# Patient Record
Sex: FEMALE | Race: WHITE | NOT HISPANIC OR LATINO | URBAN - METROPOLITAN AREA
[De-identification: names, ages, dates, MRNs, and addresses within clinical notes are randomized per-mention and may not be internally consistent; named-entity substitution may affect disease eponyms.]

---

## 2017-06-19 ENCOUNTER — OFFICE VISIT (OUTPATIENT)
Dept: FAMILY MEDICINE | Facility: CLINIC | Age: 42
End: 2017-06-19

## 2017-06-19 VITALS
HEART RATE: 66 BPM | BODY MASS INDEX: 31.4 KG/M2 | DIASTOLIC BLOOD PRESSURE: 79 MMHG | OXYGEN SATURATION: 97 % | SYSTOLIC BLOOD PRESSURE: 118 MMHG | TEMPERATURE: 98.8 F | WEIGHT: 196 LBS

## 2017-06-19 DIAGNOSIS — F17.200 TOBACCO USE DISORDER: ICD-10-CM

## 2017-06-19 DIAGNOSIS — N64.4 BREAST TENDERNESS: Primary | ICD-10-CM

## 2017-06-19 DIAGNOSIS — Z30.9 ENCOUNTER FOR CONTRACEPTIVE MANAGEMENT, UNSPECIFIED CONTRACEPTIVE ENCOUNTER TYPE: ICD-10-CM

## 2017-06-19 LAB — HCG UR QL: NEGATIVE

## 2017-06-19 NOTE — PROGRESS NOTES
Preceptor attestation:  Patient seen and discussed with the resident. Assessment and plan reviewed with resident and agreed upon.  Supervising physician: Ferdinand Malagon  [facility]

## 2017-06-19 NOTE — PATIENT INSTRUCTIONS
Avoid unprotected intercourse for the next two weeks and return for repeat pregnancy test to ensure not pregnant

## 2017-09-10 ENCOUNTER — HEALTH MAINTENANCE LETTER (OUTPATIENT)
Age: 42
End: 2017-09-10

## 2018-04-25 ENCOUNTER — TRANSFERRED RECORDS (OUTPATIENT)
Dept: HEALTH INFORMATION MANAGEMENT | Facility: CLINIC | Age: 43
End: 2018-04-25

## 2018-09-28 ENCOUNTER — OFFICE VISIT (OUTPATIENT)
Dept: FAMILY MEDICINE | Facility: CLINIC | Age: 43
End: 2018-09-28
Payer: MEDICARE

## 2018-09-28 VITALS
TEMPERATURE: 97.6 F | WEIGHT: 200.2 LBS | BODY MASS INDEX: 32.07 KG/M2 | HEART RATE: 73 BPM | RESPIRATION RATE: 18 BRPM | DIASTOLIC BLOOD PRESSURE: 75 MMHG | SYSTOLIC BLOOD PRESSURE: 108 MMHG | OXYGEN SATURATION: 96 %

## 2018-09-28 DIAGNOSIS — Z71.6 ENCOUNTER FOR SMOKING CESSATION COUNSELING: ICD-10-CM

## 2018-09-28 DIAGNOSIS — Z11.3 SCREEN FOR STD (SEXUALLY TRANSMITTED DISEASE): Primary | ICD-10-CM

## 2018-09-28 DIAGNOSIS — Z00.00 ROUTINE GENERAL MEDICAL EXAMINATION AT A HEALTH CARE FACILITY: ICD-10-CM

## 2018-09-28 DIAGNOSIS — A59.9 TRICHOMONAS INFECTION: ICD-10-CM

## 2018-09-28 LAB
BACTERIA: NORMAL
CLUE CELLS: NORMAL
HCG UR QL: NEGATIVE
MOTILE TRICHOMONAS: POSITIVE
ODOR: NORMAL
PH WET PREP: NORMAL
WBC WET PREP: NORMAL
YEAST: NORMAL

## 2018-09-28 RX ORDER — NICOTINE 21 MG/24HR
1 PATCH, TRANSDERMAL 24 HOURS TRANSDERMAL EVERY 24 HOURS
Qty: 30 PATCH | Refills: 1 | Status: SHIPPED | OUTPATIENT
Start: 2018-09-28

## 2018-09-28 NOTE — PROGRESS NOTES
Female Physical Note    Concerns today: episode of fainting. History of fainting when it was hot out. She reports she has been taking tylenol PM and ibuprofen.     ROS:  CONSTITUTIONAL:  fatigue, no unexpected change in weight, sleeping difficulty.   SKIN: no worrisome rashes, no worrisome moles, no worrisome lesions  EYES: no acute vision problems or changes  ENT: no ear problems, no mouth problems, no throat problems  RESP: no significant cough, no shortness of breath  CV: no chest pain, no palpitations, no new or worsening peripheral edema  GI: no nausea, no vomiting, no constipation, no diarrhea  Neuro: dizziness, Headache with tightly pull back hair,     Sexually Active: Yes  Sexual concerns: Yes, not on birthcontrol    Contraception:desires; interested in DEPO and was on previously   P: 1051  Menarche: 13 Patient's last menstrual period was 2018 (approximate). 28 day  STD History: at a young age but nothing recent  Last Pap Smear Date: 2013 normal  Abnormal Pap History: None    Patient Active Problem List   Diagnosis     Congenital anomaly of cerebrovascular system     Dizziness and giddiness     Headache     Health Care Home     Seizure disorder (H)     Epilepsy (H)     S/P laparoscopic cholecystectomy     Tremor     Tobacco use disorder       Current Outpatient Prescriptions   Medication Sig Dispense Refill     butalbital-acetaminophen-caffeine (FIORICET, ESGIC) per tablet Take 1 tablet by mouth. Take 1 tablet 3 times daily as needed for headache       ciprofloxacin (CIPRO) 250 MG tablet Take 1 tablet (250 mg) by mouth 2 times daily 6 tablet 0     HYDROcodone-acetaminophen (NORCO) 5-325 MG per tablet Take 1 tablet by mouth every 8 hours as needed for moderate to severe pain (Patient not taking: Reported on 2018) 30 tablet 0     levETIRAcetam (KEPPRA) 500 MG tablet Take 1 tablet by mouth 2 times daily. (Patient not taking: Reported on 2018) 60 tablet 3     levonorgestrel (PLAN B) 0.75  MG tablet Take 2 tablets (1.5 mg) by mouth once for 1 dose 2 tablet 0     meclizine (ANTIVERT) 25 MG tablet Take 25 mg by mouth 3 times daily as needed.       medroxyPROGESTERone (DEPO-PROVERA) 150 MG/ML injection Inject 1 mL (150 mg) into the muscle every 3 months (Patient not taking: Reported on 9/28/2018) 0.9 mL 0     sulfamethoxazole-trimethoprim (BACTRIM DS,SEPTRA DS) 800-160 MG per tablet Take 1 tablet by mouth 2 times daily (Patient not taking: Reported on 9/28/2018) 14 tablet 0       Past Medical History:   Diagnosis Date     Depression      GERD (gastroesophageal reflux disease)      H/O chlamydia infection      Personal history of arterial venous malformation (AVM)     pt had coiling surgery in 2006, now has chronic HAs     Seizure disorder (H)     pt sees Dr Street     Smoker         Family History        Negative family history of: Diabetes, Cancer, HEART DISEASE          Problem List Medication List and Allergy List were reviewed.    Patient is an established patient of this clinic..    Social History   Substance Use Topics     Smoking status: Current Every Day Smoker     Packs/day: 0.20     Types: Cigarettes     Smokeless tobacco: Never Used      Comment: 1/2 pack per day     Alcohol use No     Single  Children ? yes one child    Has anyone hurt you physically, for example by pushing, hitting, slapping or kicking you or forcing you to have sex? Denies  Do you feel threatened or controlled by a partner, ex-partner or anyone in your life? Denies    RISK BEHAVIORS AND HEALTHY HABITS:  Tobacco Use/Smoking: Details E cig only currently  Illicit Drug Use: Details marijuana  Do you use alcohol? No  Diet (5-7 servings of fruits/veg daily): Yes   Exercise (30 min accumulated most days):No  Dental Care: Yes   Calcium 1500 mg/d:  No  Seat Belt Use: Yes     Pap/HPV cotest every 5 years for women 30-65   Recommended and patient declined testing.  Breast CA Screening (>39 yo or 10 y before 1st degree relative  diagnosis): Recommended and patient accepted testing.  CV Risk based on Pooled Cohort Risk: unable to calculate. Patient not interested in blood draw; has daughter with and is in a camelia  Pooled Cohort Risk Calculator    Immunization History   Administered Date(s) Administered     Influenza (IIV3) PF 10/13/2011, 10/10/2012, 12/05/2013     Influenza Vaccine, 3 YRS +, IM (QUADRIVALENT W/PRESERVATIVES) 10/28/2015     Tdap (Adacel,Boostrix) 01/27/2012    Reviewed Immunization Record Today    EXAMINATION:   /75  Pulse 73  Temp 97.6  F (36.4  C) (Oral)  Resp 18  Wt 200 lb 3.2 oz (90.8 kg)  LMP 09/12/2018 (Approximate)  SpO2 96%  BMI 32.07 kg/m2  GENERAL: healthy, alert and no distress  EYES: Eyes grossly normal to inspection, extraocular movements - intact, and PERRL  HENT: ear canals- normal; TMs- normal; Nose- normal; Mouth- no ulcers, no lesions  NECK: no tenderness, no adenopathy, no asymmetry, no masses, no stiffness; thyroid- normal to palpation  RESP: lungs clear to auscultation - no rales, no rhonchi, no wheezes  BREAST: no masses, no tenderness, no nipple discharge, no palpable axillary masses or adenopathy  CV: regular rates and rhythm, normal S1 S2, no S3 or S4 and no murmur, no click or rub -  ABDOMEN: soft, no tenderness, no  hepatosplenomegaly, no masses, normal bowel sounds  MS: extremities- no gross deformities noted, no edema  SKIN: no suspicious lesions, no rashes  NEURO: strength and tone- normal, sensory exam- grossly normal, mentation- intact, speech- normal, reflexes- symmetric  BACK: no CVA tenderness, no paralumbar tenderness  - declined by patient.   RECTAL- declined by patient  PSYCH: Alert and oriented times 3; speech- coherent , normal rate and volume; able to articulate logical thoughts, able to abstract reason, no tangential thoughts, no hallucinations or delusions, affect- normal  LYMPHATICS: ant. cervical- normal, post. cervical- normal, axillary- normal, supraclavicular-  normal    ASSESSMENT:  1. Health Care Maintenance: Normal Physical Exam    PLAN:  Screen for STD (sexually transmitted disease)  -     HCG Qualitative Urine (UPT)  ([facility])  -     Wet Prep ([facility])  -     Chlamydia/Gono Amplified ([facility])    Routine general medical examination at a health care facility  -     MA SCREENING DIGITAL BILAT; Future    Encounter for smoking cessation counseling  -     nicotine (NICODERM CQ) 21 MG/24HR 24 hr patch; Place 1 patch onto the skin every 24 hours  NOTE: Patient is due for a pap smear however declined since daughter is present. Plan for patient to return when able.     Patient was seen and discussed with Dr. Marshall who agrees with assessment and plan.     Kelly Salvador, DO  PGY3

## 2018-09-28 NOTE — PROGRESS NOTES
Preceptor Attestation:   Patient seen, evaluated and discussed with the resident. I have verified the content of the note, which accurately reflects my assessment of the patient and the plan of care.   Supervising Physician:  Lloyd Marshall MD

## 2018-09-28 NOTE — MR AVS SNAPSHOT
After Visit Summary   9/28/2018    Lorena Brooks    MRN: [id number]           Patient Information     Date Of Birth          1975        Visit Information        Provider Department      9/28/2018 1:10 PM Kelly Salvador MD [facility]        Today's Diagnoses     Screen for STD (sexually transmitted disease)    -  1    Routine general medical examination at a health care facility        Encounter for smoking cessation counseling          Care Instructions      Preventive Health Recommendations  Female Ages 40 to 49    Yearly exam:     See your health care provider every year in order to  1. Review health changes.   2. Discuss preventive care.    3. Review your medicines if your doctor prescribed any.      Get a Pap test every three years (unless you have an abnormal result and your provider advises testing more often).      If you get Pap tests with HPV test, you only need to test every 5 years, unless you have an abnormal result. You do not need a Pap test if your uterus was removed (hysterectomy) and you have not had cancer.      You should be tested each year for STDs (sexually transmitted diseases), if you're at risk.     Ask your doctor if you should have a mammogram.      Have a colonoscopy (test for colon cancer) if someone in your family has had colon cancer or polyps before age 50.       Have a cholesterol test every 5 years.       Have a diabetes test (fasting glucose) after age 45. If you are at risk for diabetes, you should have this test every 3 years.    Shots: Get a flu shot each year. Get a tetanus shot every 10 years.     Nutrition:     Eat at least 5 servings of fruits and vegetables each day.    Eat whole-grain bread, whole-wheat pasta and brown rice instead of white grains and rice.    Get adequate Calcium and Vitamin D.      Lifestyle    Exercise at least 150 minutes a week (an average of 30 minutes a day, 5 days a week). This will help you control your weight and  prevent disease.    Limit alcohol to one drink per day.    No smoking.     Wear sunscreen to prevent skin cancer.    See your dentist every six months for an exam and cleaning.          Follow-ups after your visit        Future tests that were ordered for you today     Open Future Orders        Priority Expected Expires Ordered    MA SCREENING DIGITAL BILAT Routine  9/28/2019 9/28/2018            Who to contact     Please call your clinic at [phone] to:    Ask questions about your health    Make or cancel appointments    Discuss your medicines    Learn about your test results    Speak to your doctor            Additional Information About Your Visit        Care EveryWhere ID     This is your Care EveryWhere ID. This could be used by other organizations to access your [facility] medical records  [id number]        Your Vitals Were     Pulse Temperature Respirations Last Period Pulse Oximetry BMI (Body Mass Index)    73 97.6  F (36.4  C) (Oral) 18 09/12/2018 (Approximate) 96% 32.07 kg/m2       Blood Pressure from Last 3 Encounters:   09/28/18 108/75   06/19/17 118/79   06/29/16 (!) 139/91    Weight from Last 3 Encounters:   09/28/18 200 lb 3.2 oz (90.8 kg)   06/19/17 196 lb (88.9 kg)   06/29/16 200 lb 9.6 oz (91 kg)              We Performed the Following     Chlamydia/Gono Amplified ([facility])     HCG Qualitative Urine (UPT)  ([facility])     Wet Prep ([facility])          Today's Medication Changes          These changes are accurate as of 9/28/18  2:03 PM.  If you have any questions, ask your nurse or doctor.               Start taking these medicines.        Dose/Directions    nicotine 21 MG/24HR 24 hr patch   Commonly known as:  NICODERM CQ   Used for:  Encounter for smoking cessation counseling   Started by:  Kelly Salvador MD        Dose:  1 patch   Place 1 patch onto the skin every 24 hours   Quantity:  30 patch   Refills:  1            Where to get your medicines      These medications were sent to  [facility] - [place], [place] - [address], [place] [address]     Phone:  [phone]     nicotine 21 MG/24HR 24 hr patch                Primary Care Provider Office Phone # Fax #    Kelly Franny Salvador [phone]       [facility] [address] [place] [address]        Equal Access to Services     PAUL LEMONS : Hadii aad ku hadasho Soomaali, waaxda luqadaha, qaybta kaalmada adeegyada, waxay idiin hayaan adeeg kharash la'aan ah. So [facility] [phone].    ATENCIÓN: Si habla español, tiene a delgado disposición servicios gratuitos de asistencia lingüística. Amy al [phone].    We comply with applicable federal civil rights laws and [place] laws. We do not discriminate on the basis of race, color, national origin, age, disability, sex, sexual orientation, or gender identity.            Thank you!     Thank you for choosing [facility]  for your care. Our goal is always to provide you with excellent care. Hearing back from our patients is one way we can continue to improve our services. Please take a few minutes to complete the written survey that you may receive in the mail after your visit with us. Thank you!             Your Updated Medication List - Protect others around you: Learn how to safely use, store and throw away your medicines at www.disposemymeds.org.          This list is accurate as of 9/28/18  2:03 PM.  Always use your most recent med list.                   Brand Name Dispense Instructions for use Diagnosis    butalbital-acetaminophen-caffeine -40 MG per tablet    FIORICET/ESGIC     Take 1 tablet by mouth. Take 1 tablet 3 times daily as needed for headache        ciprofloxacin 250 MG tablet    CIPRO    6 tablet    Take 1 tablet (250 mg) by mouth 2 times daily    Abdominal pain       HYDROcodone-acetaminophen 5-325 MG per tablet    NORCO    30 tablet    Take 1 tablet by mouth every 8 hours as needed for moderate to severe  pain    Headache(784.0)       levETIRAcetam 500 MG tablet    KEPPRA    60 tablet    Take 1 tablet by mouth 2 times daily.    Supervision of other normal pregnancy       levonorgestrel 0.75 MG tablet    PLAN B    2 tablet    Take 2 tablets (1.5 mg) by mouth once for 1 dose    Birth control       meclizine 25 MG tablet    ANTIVERT     Take 25 mg by mouth 3 times daily as needed.        medroxyPROGESTERone 150 MG/ML injection    DEPO-PROVERA    0.9 mL    Inject 1 mL (150 mg) into the muscle every 3 months    Birth control       nicotine 21 MG/24HR 24 hr patch    NICODERM CQ    30 patch    Place 1 patch onto the skin every 24 hours    Encounter for smoking cessation counseling       sulfamethoxazole-trimethoprim 800-160 MG per tablet    BACTRIM DS/SEPTRA DS    14 tablet    Take 1 tablet by mouth 2 times daily    UTI (lower urinary tract infection)

## 2018-10-01 ENCOUNTER — RECORDS - HEALTHEAST (OUTPATIENT)
Dept: ADMINISTRATIVE | Facility: OTHER | Age: 43
End: 2018-10-01

## 2018-10-01 LAB
C TRACH RRNA SPEC QL NAA+PROBE: NEGATIVE
N GONORRHOEA RRNA SPEC QL NAA+PROBE: NEGATIVE

## 2018-10-01 RX ORDER — METRONIDAZOLE 500 MG/1
500 TABLET ORAL 2 TIMES DAILY
Qty: 14 TABLET | Refills: 0 | Status: SHIPPED | OUTPATIENT
Start: 2018-10-01 | End: 2018-10-08

## 2018-10-01 NOTE — PROGRESS NOTES
Called and discussed results of positive trichomonas and BV with patient. Prescription sent to pharmacy. Patient to discuss need for treatment with her sexual partner and use protection to prevent reinfection. Patient reports understanding of the plan.

## 2018-10-16 ENCOUNTER — OFFICE VISIT (OUTPATIENT)
Dept: FAMILY MEDICINE | Facility: CLINIC | Age: 43
End: 2018-10-16
Payer: MEDICARE

## 2018-10-16 VITALS
TEMPERATURE: 98.5 F | BODY MASS INDEX: 33.64 KG/M2 | DIASTOLIC BLOOD PRESSURE: 81 MMHG | SYSTOLIC BLOOD PRESSURE: 119 MMHG | RESPIRATION RATE: 16 BRPM | HEART RATE: 77 BPM | WEIGHT: 210 LBS | OXYGEN SATURATION: 96 %

## 2018-10-16 DIAGNOSIS — R42 DIZZINESS: ICD-10-CM

## 2018-10-16 DIAGNOSIS — R53.83 FATIGUE, UNSPECIFIED TYPE: ICD-10-CM

## 2018-10-16 DIAGNOSIS — Z86.69 HX OF SEIZURE DISORDER: ICD-10-CM

## 2018-10-16 DIAGNOSIS — Z30.9 ENCOUNTER FOR CONTRACEPTIVE MANAGEMENT, UNSPECIFIED TYPE: ICD-10-CM

## 2018-10-16 DIAGNOSIS — Z11.3 ROUTINE SCREENING FOR STI (SEXUALLY TRANSMITTED INFECTION): ICD-10-CM

## 2018-10-16 DIAGNOSIS — Z23 NEEDS FLU SHOT: ICD-10-CM

## 2018-10-16 DIAGNOSIS — Z12.4 ENCOUNTER FOR PAPANICOLAOU SMEAR FOR CERVICAL CANCER SCREENING: Primary | ICD-10-CM

## 2018-10-16 LAB
BACTERIA: NORMAL
BASOPHILS # BLD AUTO: 0.1 THOU/UL (ref 0–0.2)
BASOPHILS NFR BLD AUTO: 1 % (ref 0–2)
CLUE CELLS: NORMAL
EOSINOPHIL # BLD AUTO: 0.1 THOU/UL (ref 0–0.4)
EOSINOPHIL NFR BLD AUTO: 1 % (ref 0–6)
ERYTHROCYTE [DISTWIDTH] IN BLOOD BY AUTOMATED COUNT: 13 % (ref 11–14.5)
HCG UR QL: NEGATIVE
HCT VFR BLD AUTO: 39 % (ref 35–47)
HGB BLD-MCNC: 12.5 G/DL (ref 12–16)
HIV 1+2 AB+HIV1 P24 AG SERPL QL IA: NEGATIVE
LYMPHOCYTES # BLD AUTO: 2.6 THOU/UL (ref 0.8–4.4)
LYMPHOCYTES NFR BLD AUTO: 31 % (ref 20–40)
MCH RBC QN AUTO: 28.6 PG (ref 27–34)
MCHC RBC AUTO-ENTMCNC: 32.1 G/DL (ref 32–36)
MCV RBC AUTO: 89 FL (ref 80–100)
MONOCYTES # BLD AUTO: 0.6 THOU/UL (ref 0–0.9)
MONOCYTES NFR BLD AUTO: 7 % (ref 2–10)
MOTILE TRICHOMONAS: NEGATIVE
NEUTROPHILS # BLD AUTO: 5.1 THOU/UL (ref 2–7.7)
NEUTROPHILS NFR BLD AUTO: 61 % (ref 50–70)
ODOR: NORMAL
PH WET PREP: NORMAL
PLATELET # BLD AUTO: 248 THOU/UL (ref 140–440)
PMV BLD AUTO: 11.1 FL (ref 8.5–12.5)
RBC # BLD AUTO: 4.37 MILL/UL (ref 3.8–5.4)
TSH SERPL DL<=0.05 MIU/L-ACNC: 1.17 UIU/ML (ref 0.3–5)
WBC # BLD AUTO: 8.5 THOU/UL (ref 4–11)
WBC WET PREP: <2
YEAST: NORMAL

## 2018-10-16 NOTE — LETTER
October 31, 2018      Lorena Brooks  [address] [address]        Dear Lorena Brooks,     The results from your pap smear was normal and negative for HPV which is the virus is associated with cervical cancer. This is good news. We can plan to recheck this in 5 years! Otherwise I will see you back as planned and for your recheck of lab work or 1 year physical exam.     Thank you for allowing me to be a part of your health care!     Please see below for your test results.    Resulted Orders   HCG Qualitative Urine (UPT)  ([facility])   Result Value Ref Range    HCG Qual Urine NEGATIVE Negative   Thyroid [place] ([facility])   Result Value Ref Range    TSH 1.17 0.30 - 5.00 uIU/mL    Narrative    Test performed by:  [facility] [address] [place] [address]   CBC w/ Diff and Plt ([facility])   Result Value Ref Range    WBC 8.5 4.0 - 11.0 thou/uL    RBC 4.37 3.80 - 5.40 mill/uL    Hemoglobin 12.5 12.0 - 16.0 g/dL    Hematocrit 39.0 35.0 - 47.0 %    MCV 89 80 - 100 fL    MCH 28.6 27.0 - 34.0 pg    MCHC 32.1 32.0 - 36.0 g/dL    RDW 13.0 11.0 - 14.5 %    Platelets 248 140 - 440 thou/uL    Mean Platelet Volume 11.1 8.5 - 12.5 fL    % Neutrophils 61 50 - 70 %    % Lymphocytes 31 20 - 40 %    % Monocytes 7 2 - 10 %    % Eosinophils 1 0 - 6 %    % Basophils 1 0 - 2 %    Neutrophils (Absolute) 5.1 2.0 - 7.7 thou/uL    Lymphs (Absolute) 2.6 0.8 - 4.4 thou/uL    Monocytes(Absolute) 0.6 0.0 - 0.9 thou/uL    Eos (Absolute) 0.1 0.0 - 0.4 thou/uL    Baso (Absolute) 0.1 0.0 - 0.2 thou/uL    Narrative    Test performed by:  [facility] [address] [place] [address]   Wet Prep ([facility])   Result Value Ref Range    Yeast Wet Prep None none    Motile Trichomonas Wet Prep Negative Negative    Clue Cells Wet Prep None NONE    WBC WET PREP <2 2 - 5    Bacteria Wet Prep Few None    pH Wet Prep Not performed 3.8 - 4.5    Odor Wet Prep None NONE   Chlamydia/Gono Amplified ([facility])   Result Value  Ref Range    Chlamydia trac,Amplified Prb Negative Negative    N gonorrhoeae,Amplified Prb Negative Negative    Narrative    Test performed by:  [facility] [address] [place] [address]   HIV Ag/Ab Screen [place] ([facility])   Result Value Ref Range    HIV Antigen/Antibody Negative Negative    Narrative    Test performed by:  [facility] [address] [place] [address]  Method is Abbott HIV Ag/Ab for the detection of HIV p24 antigen, HIV-1   antibodies and HIV-2 antibodies.   Syphilis Screen [place] (RPR/VDRL) ([facility])   Result Value Ref Range    Treponema Antibody (Syphilis) Positive, referred for RPR testing (A) Negative    Narrative    Test performed by:  [facility] [address] [place] [address]   GYN Cytology ([facility])   Result Value Ref Range    Lab AP Case Report SEE RESULTS BELOW       Comment:      Gynecologic Cytology Report                       Case: [id number]                                     Authorizing Provider:  Charisse Stephens MD        Collected:             10/16/2018 1110              First Screen:          KELSIE Cedillo       Received:              10/17/2018 0949                                     (ASCP)                                                                         Specimen:    SUREPATH PAP, SCREENING, Endocervical/cervical                                               Lab AP Gyn Interpretation SEE RESULTS BELOW       Comment:      Negative for squamous intraepithelial lesion or malignancy  Electronically signed by KELSIE Cedillo (ASCP) on 10/24/2018 at  1:47 PM      Lab AP Malignant? Normal Normal    Specimen adequacy:       Satisfactory for evaluation, endocervical/transformation zone component present    HPV Reflex? Yes regardless of result     High Risk? No     Last Mens Period 10/8/18     Lab AP Abnormal Bleeding No     Lab AP Patient Status NA     Lab AP Birth Control/Hormones None     Lab AP  Previous Normal 2011     Lab AP Previous Abnl na     Lab AP Cervical Appearance pink, normal     Narrative    Test performed by:  [facility]  [address] [place] [address]   RPR (Reflex Only Order) ([facility])   Result Value Ref Range    RPR (Reflex Order Only) Non-Reactive, referred for TPPA testing Non-Reactive    Narrative    Test performed by:  [facility]  [address] [place] [address]   HPV [place] PCR ([facility])   Result Value Ref Range    HPV Source SurePath     HPV 16 DNA Negative NEG    HPV 18 DNA Negative NEG    Other HR HPV Negative NEG    Final Diagnosis SEE NOTES       Comment:      This patient's sample is negative for HPV DNA.  This test was developed and its performance characteristics determined by the  [facility]. It  has not been cleared or approved by the FDA. The laboratory is regulated under  CLIA as qualified to perform high-complexity testing. This test is used for  clinical purposes. It should not be regarded as investigational or for  research.  (Note)  METHODOLOGY:  The Roche delilah 4800 system uses automated extraction,  simultaneous amplification of HPV (L1 region) and beta-globin,  followed by  real time detection of fluorescent labeled HPV and beta  globin using specific oligonucleotide probes . The test specifically  identifies types HPV 16 DNA and HPV 18 DNA while concurrently  detecting the rest of the high risk types (31, 33, 35, 39, 45, 51,  52, 56, 58, 59, 66 or 68).     COMMENTS:  This test is not intended for use as a screening device  for women under age 30 with normal cervical   cytology.  Results should  be correlated with cytologic and histologic findings. Close clinical  followup is recommended.         Specimen Description Cervical Cells       Comment:      PERFORMED AT  [address] [address]       Narrative     Test performed by:  [facility]  [address], [place] [address]   Confirm TPPA ([facility])   Result Value Ref Range    Treponema Pallidum Ab by TP-PA Inconclusive Non Reactive      Comment:       Inconclusive; In the absence of historical or clinical evidence   of treponemal infection, this test result should be considered   equivocal. A second specimen drawn in 2 to 4 weeks should be   submitted for serologic testing. Repeated inconclusive results   should be reported as Inconclusive for further follow-up and/or   confirmed by other methods such as FTA-ABS.  Performed by [facility],  [address] [place] [id number] [phone]  [email or web address], Mamadou Champion MD, Lab. Director      Narrative    Test performed by:  [facility]  [address] [place] [id number]       If you have any questions, please call the clinic to make an appointment.    Sincerely,    Kelly Salvador MD

## 2018-10-16 NOTE — NURSING NOTE
I administered the following to Lorena Brooks.    MEDICATION: Depo Provera 150mg  ROUTE: IM  SITE: Ventrogluteal - Left  DOSE: 1 mL  LOT #: [id number]  :  Social [employer]   EXPIRATION DATE:  5/31/22  NDC#: [address]-9     Was entire vial of medication used? Yes    Did the patient bring this medication to the clinic to be injected? No    Name of provider who requested the injection: Madeleine  Name of provider on site (faculty or community preceptor) at the time of performing the injection: Kat Isbell MA

## 2018-10-16 NOTE — PROGRESS NOTES
Preceptor attestation:  Vital signs reviewed: /81  Pulse 77  Temp 98.5  F (36.9  C) (Oral)  Resp 16  Wt 210 lb (95.3 kg)  SpO2 96%  BMI 33.64 kg/m2    Patient seen, evaluated, and discussed with the resident.  I have verified the content of the note, which accurately reflects my assessment of the patient and the plan of care.    Supervising physician: Charisse Stephens MD  [facility]

## 2018-10-16 NOTE — NURSING NOTE
Injectable influenza vaccine documentation    1. Has the patient received the information for the influenza vaccine? YES    2. Does the patient have a severe allergy to eggs (Patients with a severe egg allergy should be assessed by a medical provider, RN, or clinical pharmacist. If they receive the influenza vaccine, please have them observed for 15 minutes.)? No    3. Has the patient had an allergic reaction to previous influenza vaccines? No    4. Has the patient had any severe allergic reactions to past influenza vaccines ? No       5. Does patient have a history of Guillain-Clare syndrome? No      Based on responses above, I administered the influenza vaccine.  Danika Isbell, CMA

## 2018-10-16 NOTE — MR AVS SNAPSHOT
After Visit Summary   10/16/2018    Lorena Brooks    MRN: [id number]           Patient Information     Date Of Birth          1975        Visit Information        Provider Department      10/16/2018 10:20 AM Kelly Salvador MD [facility]        Today's Diagnoses     Encounter for Papanicolaou smear for cervical cancer screening    -  1    Needs flu shot        Routine screening for STI (sexually transmitted infection)        Dizziness        Fatigue, unspecified type        Hx of seizure disorder        Encounter for contraceptive management, unspecified type          Care Instructions    10/16/2018  Lorena Brooks    It was a pleasure to see you today at [facility].     My recommendations after this visit include:   1. Lab for blood draw  2. Neurology consult  3. Follow up in 2 weeks  4. Male partner to be treated for trichomonas as well  5. Will call with results  6. If normal Pap will recheck in 5 year! Plan for yearly physical    Thank you for allowing me to be a part of your health care team!    Sincerely,   Dr. Salvador            Follow-ups after your visit        Additional Services     NEUROLOGY ADULT REFERRAL       Patient to stop at the [employer] Desk    Reason for Referral: History of seizures  Referral Location: [facility] ([place] [facility] [place]): [phone]     needed: No  Language: English    May leave message on voicemail: Yes                  Your next 10 appointments already scheduled     Nov 01, 2018  9:40 AM CDT   Return Visit with Kelly Salvador MD   [facility] ([facility])    [address] [place] [id number]   [phone]              Future tests that were ordered for you today     Open Future Orders        Priority Expected Expires Ordered    NEUROLOGY ADULT REFERRAL Routine  10/16/2019 10/16/2018            Who to contact     Please call your clinic at [phone] to:    Ask questions about your health    Make or cancel  appointments    Discuss your medicines    Learn about your test results    Speak to your doctor            Additional Information About Your Visit        Care EveryWhere ID     This is your Care EveryWhere ID. This could be used by other organizations to access your [facility] medical records  [id number]        Your Vitals Were     Pulse Temperature Respirations Pulse Oximetry BMI (Body Mass Index)       77 98.5  F (36.9  C) (Oral) 16 96% 33.64 kg/m2        Blood Pressure from Last 3 Encounters:   10/16/18 119/81   09/28/18 108/75   06/19/17 118/79    Weight from Last 3 Encounters:   10/16/18 210 lb (95.3 kg)   09/28/18 200 lb 3.2 oz (90.8 kg)   06/19/17 196 lb (88.9 kg)              We Performed the Following     ADMIN VACCINE, INITIAL     CBC w/ Diff and Plt ([facility])     Chlamydia/Gono Amplified ([facility])     FLU VAC QUADRIVLENT SPLIT VIRUS IM 0.5ml dosage     GYN Cytology ([facility])     HCG Qualitative Urine (UPT)  ([facility])     HIV Ag/Ab Screen [place] ([facility])     Syphilis Screen [place] (RPR/VDRL) ([facility])     Thyroid [place] ([facility])     Wet Prep ([facility])          Today's Medication Changes          These changes are accurate as of 10/16/18 11:01 AM.  If you have any questions, ask your nurse or doctor.               These medicines have changed or have updated prescriptions.        Dose/Directions    * medroxyPROGESTERone 150 MG/ML injection   Commonly known as:  DEPO-PROVERA   This may have changed:  Another medication with the same name was added. Make sure you understand how and when to take each.   Used for:  Birth control   Changed by:  Kelyl Salvador MD        Dose:  150 mg   Inject 1 mL (150 mg) into the muscle every 3 months   Quantity:  0.9 mL   Refills:  0       * medroxyPROGESTERone Acetate 104 MG/0.65ML injection   Commonly known as:  DEPO-SubQ PROVERA   This may have changed:  You were already taking a medication with the same name, and this prescription was added.  Make sure you understand how and when to take each.   Used for:  Encounter for contraceptive management, unspecified type   Changed by:  Kelly Salvador MD        Dose:  104 mg   Inject 0.65 mLs (104 mg) Subcutaneous every 3 months   Quantity:  0.65 mL   Refills:  3       * Notice:  This list has 2 medication(s) that are the same as other medications prescribed for you. Read the directions carefully, and ask your doctor or other care provider to review them with you.         Where to get your medicines      These medications were sent to [facility] - [place], [place] - [address], [place] [address]     Phone:  [phone]     medroxyPROGESTERone Acetate 104 MG/0.65ML injection                Primary Care Provider Office Phone # Fax #    Kelly Salvador [phone]       [facility] [address] [place] [address]        Equal Access to Services     [facility]: Hadii aad ku hadasho Sorojas, waaxda luqadaha, qaybta kaalmada adeegyada, keyon cleaning. So [facility] [phone].    ATENCIÓN: Si habla español, tiene a delgado disposición servicios gratuitos de asistencia lingüística. Llame al [phone].    We comply with applicable federal civil rights laws and [place] laws. We do not discriminate on the basis of race, color, national origin, age, disability, sex, sexual orientation, or gender identity.            Thank you!     Thank you for choosing [facility]  for your care. Our goal is always to provide you with excellent care. Hearing back from our patients is one way we can continue to improve our services. Please take a few minutes to complete the written survey that you may receive in the mail after your visit with us. Thank you!             Your Updated Medication List - Protect others around you: Learn how to safely use, store and throw away your medicines at www.disposemymeds.org.          This list is  accurate as of 10/16/18 11:01 AM.  Always use your most recent med list.                   Brand Name Dispense Instructions for use Diagnosis    butalbital-acetaminophen-caffeine -40 MG per tablet    FIORICET/ESGIC     Take 1 tablet by mouth. Take 1 tablet 3 times daily as needed for headache        ciprofloxacin 250 MG tablet    CIPRO    6 tablet    Take 1 tablet (250 mg) by mouth 2 times daily    Abdominal pain       HYDROcodone-acetaminophen 5-325 MG per tablet    NORCO    30 tablet    Take 1 tablet by mouth every 8 hours as needed for moderate to severe pain    Headache(784.0)       levETIRAcetam 500 MG tablet    KEPPRA    60 tablet    Take 1 tablet by mouth 2 times daily.    Supervision of other normal pregnancy       levonorgestrel 0.75 MG tablet    PLAN B    2 tablet    Take 2 tablets (1.5 mg) by mouth once for 1 dose    Birth control       meclizine 25 MG tablet    ANTIVERT     Take 25 mg by mouth 3 times daily as needed.        * medroxyPROGESTERone 150 MG/ML injection    DEPO-PROVERA    0.9 mL    Inject 1 mL (150 mg) into the muscle every 3 months    Birth control       * medroxyPROGESTERone Acetate 104 MG/0.65ML injection    DEPO-SubQ PROVERA    0.65 mL    Inject 0.65 mLs (104 mg) Subcutaneous every 3 months    Encounter for contraceptive management, unspecified type       nicotine 21 MG/24HR 24 hr patch    NICODERM CQ    30 patch    Place 1 patch onto the skin every 24 hours    Encounter for smoking cessation counseling       sulfamethoxazole-trimethoprim 800-160 MG per tablet    BACTRIM DS/SEPTRA DS    14 tablet    Take 1 tablet by mouth 2 times daily    UTI (lower urinary tract infection)       * Notice:  This list has 2 medication(s) that are the same as other medications prescribed for you. Read the directions carefully, and ask your doctor or other care provider to review them with you.

## 2018-10-16 NOTE — PROGRESS NOTES
S: Lorena Brooks is a 43 year old female with a PMH of   Patient Active Problem List   Diagnosis     Congenital anomaly of cerebrovascular system     Dizziness and giddiness     Headache     Health Care Home     Seizure disorder (H)     Epilepsy (H)     S/P laparoscopic cholecystectomy     Tremor     Tobacco use disorder     Encounter for contraceptive management, unspecified type     Hx of seizure disorder     presenting to clinic today with multiple concerns today.  1.  A chief complaint of dizziness with activity which she believes could be a possibility but related to heat, her menstrual cycles or her smoking.. She has passed out in July. She reports this summer this started. Not related to heat. She reports heavy menstrual cycles and last 5 days and on heavy days she uses both tampons and pads which she changes 2x per day. She reports symptoms are on and off. She denies chest pain, palpitations. Reports shortness of breath with smoking. She has also been sick with URI for 1 weeks.   2.  Patient reports she would like to have her Pap smear done due to not having her young daughter with today as her daughter is in school.  Patient reports no history of abnormal Paps in the past.  She reports she is due.    3.  Patient reports her partner has not yet been treated for trichomonas.  She has remained abstinent as a result.  She did complete her treatment for trichomonas however reports some vaginal discharge.  She reports overall however vaginal complaints are improved.  4.  Patient reports fatigue which has been an ongoing problem and possibly related to heavy menstrual cycles.  Please see #1.  5.  Patient reports history of seizures for which she has in the past seen neurology and has not followed up with them.  She reports no new concerns however would like to touch base with a new neurologist as hers had retired.      ROS:  General: No fevers, chills  Head: No headache  Ears: No acute change in hearing.    CV: No  chest pain or palpitations.  Resp: No shortness of breath.  No cough. No hemoptysis.  GI: No nausea, vomiting, constipation, diarrhea  : No urinary pains    Current Outpatient Prescriptions   Medication Sig Dispense Refill     butalbital-acetaminophen-caffeine (FIORICET, ESGIC) per tablet Take 1 tablet by mouth. Take 1 tablet 3 times daily as needed for headache       levETIRAcetam (KEPPRA) 500 MG tablet Take 1 tablet by mouth 2 times daily. 60 tablet 3     meclizine (ANTIVERT) 25 MG tablet Take 25 mg by mouth 3 times daily as needed.       medroxyPROGESTERone Acetate (DEPO-SUBQ PROVERA) 104 MG/0.65ML injection Inject 0.65 mLs (104 mg) Subcutaneous every 3 months 0.65 mL 3     nicotine (NICODERM CQ) 21 MG/24HR 24 hr patch Place 1 patch onto the skin every 24 hours 30 patch 1     ciprofloxacin (CIPRO) 250 MG tablet Take 1 tablet (250 mg) by mouth 2 times daily (Patient not taking: Reported on 10/16/2018) 6 tablet 0     HYDROcodone-acetaminophen (NORCO) 5-325 MG per tablet Take 1 tablet by mouth every 8 hours as needed for moderate to severe pain (Patient not taking: Reported on 9/28/2018) 30 tablet 0     levonorgestrel (PLAN B) 0.75 MG tablet Take 2 tablets (1.5 mg) by mouth once for 1 dose 2 tablet 0     medroxyPROGESTERone (DEPO-PROVERA) 150 MG/ML injection Inject 1 mL (150 mg) into the muscle every 3 months (Patient not taking: Reported on 9/28/2018) 0.9 mL 0     sulfamethoxazole-trimethoprim (BACTRIM DS,SEPTRA DS) 800-160 MG per tablet Take 1 tablet by mouth 2 times daily (Patient not taking: Reported on 9/28/2018) 14 tablet 0       O: /81  Pulse 77  Temp 98.5  F (36.9  C) (Oral)  Resp 16  Wt 210 lb (95.3 kg)  SpO2 96%  BMI 33.64 kg/m2   Gen:  Well nourished and in No acute distress   Neck: supple without lymphadenopathy, no thyromegaly  CV:  RRR  - no murmurs noted   Pulm:  CTAB, no wheezes or crackles noted, good air entry   ABD: soft, nontender, no masses, no rebound, BS intact throughout, no  hepatosplenomegaly  : Thin watery light brown colored vaginal discharge.  No bleeding.  Normal-appearing pink cervix.  Extrem: no cyanosis, edema or clubbing  Psych: Euthymic      Assessment and Plan:  Lorena was seen today for gyn exam, contraception and dizziness.    Diagnoses and all orders for this visit:    Encounter for Papanicolaou smear for cervical cancer screening  -     GYN Cytology ([facility])    Needs flu shot  -     ADMIN VACCINE, INITIAL  -     FLU VAC QUADRIVLENT SPLIT VIRUS IM 0.5ml dosage    Routine screening for STI (sexually transmitted infection)  -     Wet Prep ([facility])  -     Chlamydia/Gono Amplified ([facility])  -     HIV Ag/Ab Screen [place] ([facility])  -     Syphilis Screen [place] (RPR/VDRL) ([facility])  Comment: Due to recent history of trichomonas we will check for resolution of infection.  Advised patient to her sexual partner needs to be treated.    Dizziness  -     CBC w/ Diff and Plt ([facility])  Comment: Due to patient's history of vaginal bleeding which she reported to be heavy and history of possible syncopal episode or presyncopal episode in the summer for which she is still concerned about we will monitor hemoglobin.    Fatigue, unspecified type  -     Thyroid [place] ([facility])  Comment: Due to abnormal bleeding and fatigue will check thyroid function.    Hx of seizure disorder  -     NEUROLOGY ADULT REFERRAL; Future    Encounter for contraceptive management, unspecified type  -     medroxyPROGESTERone Acetate (DEPO-SUBQ PROVERA) 104 MG/0.65ML injection; Inject 0.65 mLs (104 mg) Subcutaneous every 3 months    Other orders  -     HCG Qualitative Urine (UPT)  ([facility])  Comment: Negative urine pregnancy test verified by Dr. Meyer and myself.  Therefore will continue with Depakote injection.    Comment: Due to overall numerous concerns I recommended patient follow-up in 2 weeks to check in and see how she is doing and go over any concerning lab results or to decide on  next action if labs are normal.  Patient agrees with plan.    This patient was seen and discussed with Dr. Stephens who agrees with the assessment and plan.     Kelly Salvador, DO  PGY3

## 2018-10-16 NOTE — PATIENT INSTRUCTIONS
"10/16/2018  Lorena Brooks    It was a pleasure to see you today at [facility].     My recommendations after this visit include:   1. Lab for blood draw  2. Neurology consult  3. Follow up in 2 weeks  4. Male partner to be treated for trichomonas as well  5. Will call with results  6. If normal Pap will recheck in 5 year! Plan for yearly physical    Thank you for allowing me to be a part of your health care team!    Sincerely,   Dr. Salvador    NEUROLOGY ADULT REFERRAL   October 17, 2018 at 11:05 am per Dr. Salvador - ok to see another Provider as Neurological Associate are scheduling out into December 2018.  Referral placed online with [facility]   \"The form was submitted successfully.  We will contact your patient to schedule an appointment.  A [facility] staff member will contact your patient within 24 business hours to schedule an appointment.\"  [facility]  Phone: [phone]  Scheduling/Referrals Fax: [phone]  [facility]    "

## 2018-10-17 LAB
C TRACH RRNA CVX QL NAA+PROBE: NEGATIVE
FINAL DIAGNOSIS: NORMAL
HPV 16 DNA: NEGATIVE
HPV 18 DNA: NEGATIVE
HPV SOURCE: NORMAL
N GONORRHOEA RRNA SPEC QL NAA+PROBE: NEGATIVE
OTHER HR HPV: NEGATIVE
SPECIMEN DESCRIPTION: NORMAL
TREPONEMA ANTIBODY (SYPHILIS): ABNORMAL

## 2018-10-18 LAB — RPR (REFLEX ORDER ONLY): NORMAL

## 2018-10-19 NOTE — PROGRESS NOTES
Attempted to call patient with negative results. RPR reflex negative. Still awaiting pap results.   Kelly Salvador, DO  PGY3

## 2018-10-20 LAB — TREPONEMA PALLIDUM AB BY TP-PA: NORMAL

## 2018-10-23 NOTE — PROGRESS NOTES
Attempted to call patient regarding inconclusive results. Left message on voicemail to call clinic to schedule appointment in 1 week (10/30/18). Patient will need to have a repeat RPR test as the syphilis test was inconclusive and repeat testing is recommended. Thank you!  Kelly Salvador, DO  PGY3

## 2018-10-24 ENCOUNTER — RECORDS - HEALTHEAST (OUTPATIENT)
Dept: ADMINISTRATIVE | Facility: OTHER | Age: 43
End: 2018-10-24

## 2018-10-24 LAB
CYTOLOGY CVX/VAG DOC THIN PREP: NORMAL
HIGH RISK?: NO
HPV REFLEX?: NORMAL
LAB AP ABNORMAL BLEEDING: NO
LAB AP BIRTH CONTROL/HORMONES: NORMAL
LAB AP CASE REPORT: NORMAL
LAB AP CERVICAL APPEARANCE: NORMAL
LAB AP MALIGNANT?: NORMAL
LAB AP PATIENT STATUS: NORMAL
LAB AP PREVIOUS ABNL: NORMAL
LAB AP PREVIOUS NORMAL: 2011
LAST MENS PERIOD: NORMAL
SPECIMEN ADEQUACY:: NORMAL

## 2018-10-29 NOTE — PROGRESS NOTES
{Please send letter to patient with lab results.    Dear Lorena Brooks,     The results from your pap smear was normal and negative for HPV which is the virus is associated with cervical cancer. This is good news. We can plan to recheck this in 5 years! Otherwise I will see you back as planned and for your recheck of lab work or 1 year physical exam.     Thank you for allowing me to be a part of your health care!    Sincerely,   Dr. Salvador  10/29/2018

## 2018-11-20 ENCOUNTER — OFFICE VISIT (OUTPATIENT)
Dept: FAMILY MEDICINE | Facility: CLINIC | Age: 43
End: 2018-11-20
Payer: MEDICARE

## 2018-11-20 VITALS
DIASTOLIC BLOOD PRESSURE: 79 MMHG | HEART RATE: 79 BPM | OXYGEN SATURATION: 98 % | BODY MASS INDEX: 32.68 KG/M2 | TEMPERATURE: 98.2 F | WEIGHT: 204 LBS | SYSTOLIC BLOOD PRESSURE: 118 MMHG | RESPIRATION RATE: 16 BRPM

## 2018-11-20 DIAGNOSIS — N89.8 VAGINAL DISCHARGE: Primary | ICD-10-CM

## 2018-11-20 LAB
BACTERIA: NORMAL
CLUE CELLS: NORMAL
MOTILE TRICHOMONAS: NEGATIVE
ODOR: NORMAL
PH WET PREP: NORMAL
WBC WET PREP: NORMAL
YEAST: NORMAL

## 2018-11-20 NOTE — MR AVS SNAPSHOT
After Visit Summary   11/20/2018    Lorena Brooks    MRN: [id number]           Patient Information     Date Of Birth          1975        Visit Information        Provider Department      11/20/2018 8:40 AM Kelly Salvador MD [facility]        Today's Diagnoses     Vaginal discharge    -  1       Follow-ups after your visit        Follow-up notes from your care team     Return in about 1 week (around 11/27/2018).      Who to contact     Please call your clinic at [phone] to:    Ask questions about your health    Make or cancel appointments    Discuss your medicines    Learn about your test results    Speak to your doctor            Additional Information About Your Visit        Care EveryWhere ID     This is your Care EveryWhere ID. This could be used by other organizations to access your [facility] medical records  [id number]        Your Vitals Were     Pulse Temperature Respirations Pulse Oximetry BMI (Body Mass Index)       79 98.2  F (36.8  C) (Oral) 16 98% 32.68 kg/m2        Blood Pressure from Last 3 Encounters:   11/20/18 118/79   10/24/18 107/74   10/16/18 119/81    Weight from Last 3 Encounters:   11/20/18 204 lb (92.5 kg)   10/24/18 201 lb (91.2 kg)   10/16/18 210 lb (95.3 kg)              We Performed the Following     Chlamydia/Gono Amplified ([facility])     Syphilis Screen [place] (RPR/VDRL) ([facility])     Wet Prep ([facility] )        Primary Care Provider Office Phone # Fax #    Kelly Salvador [phone]       [address] [place] [address]        Equal Access to Services     [facility]: Hadii aad ku hadasho Sokatali, waaxda luqadaha, qaybta kaalmada adeegyada, waxay lien humphreys . So [facility] [phone].    ATENCIÓN: Si habla español, tiene a delgado disposición servicios gratuitos de asistencia lingüística. Llame al [phone].    We comply with applicable federal civil rights laws and [place] laws. We do not  discriminate on the basis of race, color, national origin, age, disability, sex, sexual orientation, or gender identity.            Thank you!     Thank you for choosing [facility]  for your care. Our goal is always to provide you with excellent care. Hearing back from our patients is one way we can continue to improve our services. Please take a few minutes to complete the written survey that you may receive in the mail after your visit with us. Thank you!             Your Updated Medication List - Protect others around you: Learn how to safely use, store and throw away your medicines at www.disposemymeds.org.          This list is accurate as of 11/20/18  2:20 PM.  Always use your most recent med list.                   Brand Name Dispense Instructions for use Diagnosis    butalbital-acetaminophen-caffeine -40 MG per tablet    FIORICET/ESGIC     Take 1 tablet by mouth. Take 1 tablet 3 times daily as needed for headache        ciprofloxacin 250 MG tablet    CIPRO    6 tablet    Take 1 tablet (250 mg) by mouth 2 times daily    Abdominal pain       HYDROcodone-acetaminophen 5-325 MG per tablet    NORCO    30 tablet    Take 1 tablet by mouth every 8 hours as needed for moderate to severe pain    Headache(784.0)       levETIRAcetam 500 MG tablet    KEPPRA    60 tablet    Take 1 tablet by mouth 2 times daily.    Supervision of other normal pregnancy       levonorgestrel 0.75 MG tablet    PLAN B    2 tablet    Take 2 tablets (1.5 mg) by mouth once for 1 dose    Birth control       meclizine 25 MG tablet    ANTIVERT     Take 25 mg by mouth 3 times daily as needed.        * medroxyPROGESTERone 150 MG/ML injection    DEPO-PROVERA    0.9 mL    Inject 1 mL (150 mg) into the muscle every 3 months    Birth control       * medroxyPROGESTERone Acetate 104 MG/0.65ML injection    DEPO-SubQ PROVERA    0.65 mL    Inject 0.65 mLs (104 mg) Subcutaneous every 3 months    Encounter for contraceptive management, unspecified  type       nicotine 21 MG/24HR 24 hr patch    NICODERM CQ    30 patch    Place 1 patch onto the skin every 24 hours    Encounter for smoking cessation counseling       sulfamethoxazole-trimethoprim 800-160 MG per tablet    BACTRIM DS/SEPTRA DS    14 tablet    Take 1 tablet by mouth 2 times daily    UTI (lower urinary tract infection)       * Notice:  This list has 2 medication(s) that are the same as other medications prescribed for you. Read the directions carefully, and ask your doctor or other care provider to review them with you.

## 2018-11-20 NOTE — PROGRESS NOTES
Preceptor Attestation:   Patient seen, evaluated and discussed with the resident. I have verified the content of the note, which accurately reflects my assessment of the patient and the plan of care.   Supervising Physician:  Anderson Mccracken MD

## 2018-11-20 NOTE — PROGRESS NOTES
S: Lorena Brooks is a 43 year old female with a PMH of   Patient Active Problem List   Diagnosis     Congenital anomaly of cerebrovascular system     Dizziness and giddiness     Headache     Health Care Home     Seizure disorder (H)     Epilepsy (H)     S/P laparoscopic cholecystectomy     Tremor     Tobacco use disorder     Encounter for contraceptive management, unspecified type     Hx of seizure disorder     presenting to clinic today with a chief complaint of vaginal discharge and concern for possible recurrence of trichomonas.  Patient reports concerns regarding her past sexual partner who did not believe that this was a male associated illness and it is unclear whether or not he was treated.  Patient reports completing her treatment for trichomonas.  Patient however continues to have vaginal discharge but denies bleeding. .      ROS:  General: No fevers, chills  GI: No nausea, vomiting, constipation, diarrhea  : No urinary pains    Current Outpatient Prescriptions   Medication Sig Dispense Refill     butalbital-acetaminophen-caffeine (FIORICET, ESGIC) per tablet Take 1 tablet by mouth. Take 1 tablet 3 times daily as needed for headache       HYDROcodone-acetaminophen (NORCO) 5-325 MG per tablet Take 1 tablet by mouth every 8 hours as needed for moderate to severe pain 30 tablet 0     levETIRAcetam (KEPPRA) 500 MG tablet Take 1 tablet by mouth 2 times daily. 60 tablet 3     meclizine (ANTIVERT) 25 MG tablet Take 25 mg by mouth 3 times daily as needed.       medroxyPROGESTERone (DEPO-PROVERA) 150 MG/ML injection Inject 1 mL (150 mg) into the muscle every 3 months 0.9 mL 0     medroxyPROGESTERone Acetate (DEPO-SUBQ PROVERA) 104 MG/0.65ML injection Inject 0.65 mLs (104 mg) Subcutaneous every 3 months 0.65 mL 3     nicotine (NICODERM CQ) 21 MG/24HR 24 hr patch Place 1 patch onto the skin every 24 hours 30 patch 1     ciprofloxacin (CIPRO) 250 MG tablet Take 1 tablet (250 mg) by mouth 2 times daily (Patient not  taking: Reported on 10/16/2018) 6 tablet 0     levonorgestrel (PLAN B) 0.75 MG tablet Take 2 tablets (1.5 mg) by mouth once for 1 dose 2 tablet 0     sulfamethoxazole-trimethoprim (BACTRIM DS,SEPTRA DS) 800-160 MG per tablet Take 1 tablet by mouth 2 times daily (Patient not taking: Reported on 9/28/2018) 14 tablet 0       O: /79  Pulse 79  Temp 98.2  F (36.8  C) (Oral)  Resp 16  Wt 204 lb (92.5 kg)  SpO2 98%  BMI 32.68 kg/m2   Gen:  Well nourished and in No acute distress   ABD: soft, nontender, no masses, no rebound, BS intact throughout, no hepatosplenomegaly  Psych: Euthymic      Assessment and Plan:  Lorena was seen today for recheck and vaginal problem.    Diagnoses and all orders for this visit:    Vaginal discharge  -     Wet Prep ([facility])  -     Chlamydia/Gono Amplified ([facility])  -     Syphilis Screen [place] (RPR/VDRL) ([facility])    Comment: Patient decided to do the self wet prep.  Discussed with patient if she continues to have vaginal discharge and negative screening tests with urine GC chlamydia, would recommend pelvic exam to obtain samples for GC chlamydia.  Also patient had an inconclusive syphilis screen cascade results after a initial positive.  Patient has now finally followed up to repeat this which will be done today.  Patient reports she is not sexually active with the same partner due to concerns that he may not be faithful.  Patient to plan to return in 1 week if continued symptoms and negative results.  Patient reported understanding    This patient was seen and discussed with Dr. Mccracken who agrees with the assessment and plan.     Kelly Salvador, DO  PGY3

## 2018-11-20 NOTE — PROGRESS NOTES
Discussed negative and normal result of wet prep during visit.  The results will be communicated to patient when available.    Kelly Salvador, DO  PGY3

## 2018-11-21 LAB
C TRACH RRNA SPEC QL NAA+PROBE: NEGATIVE
N GONORRHOEA RRNA SPEC QL NAA+PROBE: NEGATIVE
RPR (REFLEX ORDER ONLY): NORMAL
TREPONEMA ANTIBODY (SYPHILIS): ABNORMAL

## 2018-11-22 LAB — TREPONEMA PALLIDUM AB BY TP-PA: REACTIVE

## 2018-11-26 NOTE — PROGRESS NOTES
{Please call patient with results. Attempted  and [facility] to call clinic    Dear Lorena Brooks,     The results from your syphilis screening came back as positive. You need to come in for a visit to check a urine pregnancy test and if this is negative start treatment for latent syphilis. This is 2.4 million Units PCN weekly for 3 weeks. You should also discuss with your sexual partners. You can make appointment to see a provider in clinic.    Thank you for allowing me to be a part of your health care!    Sincerely,   Dr. Salvador  11/26/2018

## 2018-11-28 ENCOUNTER — OFFICE VISIT (OUTPATIENT)
Dept: FAMILY MEDICINE | Facility: CLINIC | Age: 43
End: 2018-11-28
Payer: MEDICARE

## 2018-11-28 VITALS
OXYGEN SATURATION: 97 % | DIASTOLIC BLOOD PRESSURE: 70 MMHG | HEIGHT: 66 IN | RESPIRATION RATE: 20 BRPM | WEIGHT: 198 LBS | SYSTOLIC BLOOD PRESSURE: 103 MMHG | BODY MASS INDEX: 31.82 KG/M2 | HEART RATE: 65 BPM | TEMPERATURE: 97.9 F

## 2018-11-28 DIAGNOSIS — A53.0 POSITIVE SEROLOGY FOR SYPHILIS: Primary | ICD-10-CM

## 2018-11-28 DIAGNOSIS — K08.89 TOOTH PAIN: ICD-10-CM

## 2018-11-28 LAB — HCG UR QL: NEGATIVE

## 2018-11-28 RX ORDER — ACETAMINOPHEN 500 MG
1000 TABLET ORAL EVERY 8 HOURS PRN
COMMUNITY
Start: 2018-05-31

## 2018-11-28 RX ORDER — NAPROXEN 500 MG/1
500 TABLET ORAL 2 TIMES DAILY PRN
Qty: 30 TABLET | Refills: 0 | Status: SHIPPED | OUTPATIENT
Start: 2018-11-28

## 2018-11-28 RX ORDER — IBUPROFEN 600 MG/1
600 TABLET, FILM COATED ORAL 4 TIMES DAILY PRN
COMMUNITY
Start: 2016-03-26

## 2018-11-28 ASSESSMENT — PAIN SCALES - GENERAL: PAINLEVEL: NO PAIN (0)

## 2018-11-28 NOTE — NURSING NOTE
Chief Complaint   Patient presents with     RECHECK     Positive Syphilis Test/ UPT     Harsh Carr CMA      I administered the following to Lorena Brooks.    MEDICATION: Bicillin CR 1.2  ROUTE: IM  SITE: RUQ - Gluteus (Both Sides)  DOSE: 2,4 232022 UNITS  LOT #: [id number]  :  [employer]  EXPIRATION DATE:  04/30/2021  NDC#: 89016-274-35     Was entire vial of medication used? Yes    Did the patient bring this medication to the clinic to be injected? No    Name of provider who requested the injection: DR. AUDREY PINO  Name of provider on site (faculty or community preceptor) at the time of performing the injection: DR. ZULEIMA Carr CMA

## 2018-11-28 NOTE — PROGRESS NOTES
Preceptor Attestation:   Patient seen, evaluated and discussed with the resident. I have verified the content of the note, which accurately reflects my assessment of the patient and the plan of care.   Supervising Physician:  Randy Hamilton MD

## 2018-11-28 NOTE — PROGRESS NOTES
S: Lorena Brooks is a 43 year old female with a PMH of   Patient Active Problem List   Diagnosis     Congenital anomaly of cerebrovascular system     Dizziness and giddiness     Headache     Health Care Home     Seizure disorder (H)     Epilepsy (H)     S/P laparoscopic cholecystectomy     Tremor     Tobacco use disorder     Encounter for contraceptive management, unspecified type     Hx of seizure disorder     Positive serology for syphilis     Anemia     Aneurysm (H)     Cerebral AV malformation     Cholelithiasis     Need for vaccination     Vitamin D insufficiency     presenting to clinic today with a chief complaint of returning to clinic for positive syphilis screening.  Patient reports that she has not been sexually active for prolonged period of time due to her history of positive STI screening for trichomonas with her previous partner.  Patient states she is no longer sexually active and specifically not sexually active with this person.  She reports she is no longer in contact with the person due to concerns for recent STI.  Patient reports she is not using condoms due to not being sexually active at this time.  Patient denies vaginal discharge or complaints.     Patient also reports she is having a left-sided tooth pain after having a tooth removed.  Patient states she has not been taking any medication for this due to concern that it may make her drowsy.  She states she has to  her daughter from school and therefore has been hesitant to take any medicine for pain for this.  Patient reports trying ice only for dental pain at this time.  This has been minimally helpful.      ROS:  General: No fevers, chills  : No urinary pains    Current Outpatient Prescriptions   Medication Sig Dispense Refill     acetaminophen (TYLENOL) 500 MG tablet Take 1,000 mg by mouth every 8 hours as needed       butalbital-acetaminophen-caffeine (FIORICET, ESGIC) per tablet Take 1 tablet by mouth. Take 1 tablet 3 times  "daily as needed for headache       ibuprofen (ADVIL/MOTRIN) 600 MG tablet Take 600 mg by mouth 4 times daily as needed       levETIRAcetam (KEPPRA) 500 MG tablet Take 1 tablet by mouth 2 times daily. 60 tablet 3     medroxyPROGESTERone (DEPO-PROVERA) 150 MG/ML injection Inject 1 mL (150 mg) into the muscle every 3 months 0.9 mL 0     naproxen (NAPROSYN) 500 MG tablet Take 1 tablet (500 mg) by mouth 2 times daily as needed for moderate pain 30 tablet 0     nicotine (NICODERM CQ) 21 MG/24HR 24 hr patch Place 1 patch onto the skin every 24 hours 30 patch 1     penicillin G (BICILLIN L-A) [id number] UNIT/ML injection Inject 4 mLs (2,400,000 Units) into the muscle every 7 days for 3 doses 12 mL 0     [DISCONTINUED] medroxyPROGESTERone Acetate (DEPO-SUBQ PROVERA) 104 MG/0.65ML injection Inject 0.65 mLs (104 mg) Subcutaneous every 3 months 0.65 mL 3       O: /70  Pulse 65  Temp 97.9  F (36.6  C) (Oral)  Resp 20  Ht 5' 6\" (167.6 cm)  Wt 198 lb (89.8 kg)  SpO2 97%  Breastfeeding? No  BMI 31.96 kg/m2   Gen:  Well nourished and in No acute distress   HEENT: Small amount of swelling over left lateral jaw  Psych: Euthymic      Assessment and Plan:  Lorena was seen today for recheck.    Diagnoses and all orders for this visit:    Positive serology for syphilis  -     HCG Qualitative Urine (UPT)  ([facility]); Future  -     HCG Qualitative Urine (UPT)  ([facility])  -     penicillin G (BICILLIN L-A) [id number] UNIT/ML injection; Inject 4 mLs (2,400,000 Units) into the muscle every 7 days for 3 doses    Tooth pain  -     naproxen (NAPROSYN) 500 MG tablet; Take 1 tablet (500 mg) by mouth 2 times daily as needed for moderate pain    Comment: In terms of patient's positive syphilis screening, patient will be treated for latent syphilis.  This was discussed with the [facility] on 11/26/18.  After this discussion it was recommended we follow-up with a urine pregnancy test to make sure patient is not pregnant.  UPT was " negative today.  This was discussed with patient.  Patient will start once weekly penicillin G injections for 3 weeks.  Patient reports unexplained.    In terms of tooth pain patient will be sent with naproxen and advised to return if new or worsening symptoms.    This patient was seen and discussed with Dr. Hamilton who agrees with the assessment and plan.     Kelly Salvador, DO  PGY3

## 2018-11-28 NOTE — MR AVS SNAPSHOT
"              After Visit Summary   11/28/2018    Lorena Brooks    MRN: [id number]           Patient Information     Date Of Birth          1975        Visit Information        Provider Department      11/28/2018 8:40 AM Kelly Salvador MD [facility]        Today's Diagnoses     Positive serology for syphilis    -  1    Tooth pain          Care Instructions    11/28/2018  Lorena Brooks    It was a pleasure to see you today at [facility].     My recommendations after this visit include:   1. Return in 1 week for repeat injection   2. Will need 3 total appointments for treatment (one completed today)    Thank you for allowing me to be a part of your health care team!    Sincerely,   Dr. Salvador            Follow-ups after your visit        Follow-up notes from your care team     Return in about 1 week (around 12/5/2018) for IM injection.      Your next 10 appointments already scheduled     Dec 05, 2018  8:45 AM CST   Nurse Visit with [facility] Nurse   [facility] ([facility])    [address] [place] [address]   [phone]            Dec 12, 2018  9:00 AM CST   Nurse Visit with [facility] Nurse   [facility] ([facility])    [address] [place] [address]   [phone]              Who to contact     Please call your clinic at [phone] to:    Ask questions about your health    Make or cancel appointments    Discuss your medicines    Learn about your test results    Speak to your doctor            Additional Information About Your Visit        Care EveryWhere ID     This is your Care EveryWhere ID. This could be used by other organizations to access your [facility] medical records  [id number]        Your Vitals Were     Pulse Temperature Respirations Height Pulse Oximetry Breastfeeding?    65 97.9  F (36.6  C) (Oral) 20 5' 6\" (167.6 cm) 97% No    BMI (Body Mass Index)                   31.96 kg/m2            Blood Pressure from Last 3 Encounters:   11/28/18 103/70   11/20/18 " 118/79   10/24/18 107/74    Weight from Last 3 Encounters:   11/28/18 198 lb (89.8 kg)   11/20/18 204 lb (92.5 kg)   10/24/18 201 lb (91.2 kg)              We Performed the Following     HCG Qualitative Urine (UPT)  ([facility])          Today's Medication Changes          These changes are accurate as of 11/28/18 11:57 AM.  If you have any questions, ask your nurse or doctor.               Start taking these medicines.        Dose/Directions    naproxen 500 MG tablet   Commonly known as:  NAPROSYN   Used for:  Tooth pain   Started by:  Kelly Salvador MD        Dose:  500 mg   Take 1 tablet (500 mg) by mouth 2 times daily as needed for moderate pain   Quantity:  30 tablet   Refills:  0       penicillin G 162355 UNIT/ML injection   Commonly known as:  BICILLIN L-A   Used for:  Positive serology for syphilis   Started by:  Kelly Salvador MD        Dose:  [id number] Units   Inject 4 mLs (2,400,000 Units) into the muscle every 7 days for 3 doses   Quantity:  12 mL   Refills:  0            Where to get your medicines      These medications were sent to [id number] - [place], [place] - [address], [place] [address]     Phone:  [phone]     naproxen 500 MG tablet         Some of these will need a paper prescription and others can be bought over the counter.  Ask your nurse if you have questions.     You don't need a prescription for these medications     penicillin G [id number] UNIT/ML injection                Primary Care Provider Office Phone # Fax #    Kelly Salvador [phone]       [address] [place] [address]        Equal Access to Services     BURT LEMONS AH: Hadii eddie farfan Sorojas, waaxda luqadaha, qaybta kaalmada eugenio, keyon cleaning. So [facility] [phone].    ATENCIÓN: Si habla español, tiene a delgado disposición servicios gratuitos de asistencia lingüística. Llame al [phone].    We comply with  applicable federal civil rights laws and [place] laws. We do not discriminate on the basis of race, color, national origin, age, disability, sex, sexual orientation, or gender identity.            Thank you!     Thank you for choosing [facility]  for your care. Our goal is always to provide you with excellent care. Hearing back from our patients is one way we can continue to improve our services. Please take a few minutes to complete the written survey that you may receive in the mail after your visit with us. Thank you!             Your Updated Medication List - Protect others around you: Learn how to safely use, store and throw away your medicines at www.disposemymeds.org.          This list is accurate as of 11/28/18 11:57 AM.  Always use your most recent med list.                   Brand Name Dispense Instructions for use Diagnosis    acetaminophen 500 MG tablet    TYLENOL     Take 1,000 mg by mouth every 8 hours as needed        butalbital-acetaminophen-caffeine -40 MG tablet    FIORICET/ESGIC     Take 1 tablet by mouth. Take 1 tablet 3 times daily as needed for headache        ibuprofen 600 MG tablet    ADVIL/MOTRIN     Take 600 mg by mouth 4 times daily as needed        levETIRAcetam 500 MG tablet    KEPPRA    60 tablet    Take 1 tablet by mouth 2 times daily.    Supervision of other normal pregnancy       medroxyPROGESTERone 150 MG/ML injection    DEPO-PROVERA    0.9 mL    Inject 1 mL (150 mg) into the muscle every 3 months    Birth control       naproxen 500 MG tablet    NAPROSYN    30 tablet    Take 1 tablet (500 mg) by mouth 2 times daily as needed for moderate pain    Tooth pain       nicotine 21 MG/24HR 24 hr patch    NICODERM CQ    30 patch    Place 1 patch onto the skin every 24 hours    Encounter for smoking cessation counseling       penicillin G [id number] UNIT/ML injection    BICILLIN L-A    12 mL    Inject 4 mLs (2,400,000 Units) into the muscle every 7 days for 3 doses    Positive  serology for syphilis

## 2018-11-28 NOTE — PATIENT INSTRUCTIONS
11/28/2018  Lorena Brooks    It was a pleasure to see you today at [facility].     My recommendations after this visit include:   1. Return in 1 week for repeat injection   2. Will need 3 total appointments for treatment (one completed today)    Thank you for allowing me to be a part of your health care team!    Sincerely,   Dr. Salvador

## 2018-12-06 ENCOUNTER — ALLIED HEALTH/NURSE VISIT (OUTPATIENT)
Dept: FAMILY MEDICINE | Facility: CLINIC | Age: 43
End: 2018-12-06
Payer: MEDICARE

## 2018-12-06 VITALS
WEIGHT: 200 LBS | HEART RATE: 80 BPM | TEMPERATURE: 97.8 F | DIASTOLIC BLOOD PRESSURE: 78 MMHG | BODY MASS INDEX: 32.28 KG/M2 | SYSTOLIC BLOOD PRESSURE: 114 MMHG | RESPIRATION RATE: 16 BRPM | OXYGEN SATURATION: 97 %

## 2018-12-06 DIAGNOSIS — A53.0 POSITIVE SEROLOGY FOR SYPHILIS: Primary | ICD-10-CM

## 2018-12-06 NOTE — MR AVS SNAPSHOT
After Visit Summary   12/6/2018    Lorena Brooks    MRN: [id number]           Patient Information     Date Of Birth          1975        Visit Information        Provider Department      12/6/2018 9:00 AM Nurse, [facility]        Today's Diagnoses     Positive serology for syphilis    -  1       Follow-ups after your visit        Your next 10 appointments already scheduled     Dec 12, 2018  9:00 AM CST   Nurse Visit with [facility] Nurse   [facility] ([facility])    [address] [place] [address]   [phone]              Who to contact     Please call your clinic at [phone] to:    Ask questions about your health    Make or cancel appointments    Discuss your medicines    Learn about your test results    Speak to your doctor            Additional Information About Your Visit        Care EveryWhere ID     This is your Care EveryWhere ID. This could be used by other organizations to access your [facility] medical records  [id number]        Your Vitals Were     Pulse Temperature Respirations Pulse Oximetry BMI (Body Mass Index)       80 97.8  F (36.6  C) (Oral) 16 97% 32.28 kg/m2        Blood Pressure from Last 3 Encounters:   12/06/18 114/78   11/28/18 103/70   11/20/18 118/79    Weight from Last 3 Encounters:   12/06/18 200 lb (90.7 kg)   11/28/18 198 lb (89.8 kg)   11/20/18 204 lb (92.5 kg)              Today, you had the following     No orders found for display       Primary Care Provider Office Phone # Fax #    Kelly Franny Salvador [phone]       [address] [place] [address]        Equal Access to Services     [facility] CRISTO AH: Hadii eddie farris hadasho Sokatali, waaxda luqadaha, qaybta kaalmada adeegyada, keyon cleaning. So [facility] [phone].    ATENCIÓN: Si habla español, tiene a delgado disposición servicios gratuitos de asistencia lingüística. Llame al [phone].    We comply with applicable federal civil rights laws  and [place] laws. We do not discriminate on the basis of race, color, national origin, age, disability, sex, sexual orientation, or gender identity.            Thank you!     Thank you for choosing [facility]  for your care. Our goal is always to provide you with excellent care. Hearing back from our patients is one way we can continue to improve our services. Please take a few minutes to complete the written survey that you may receive in the mail after your visit with us. Thank you!             Your Updated Medication List - Protect others around you: Learn how to safely use, store and throw away your medicines at www.disposemymeds.org.          This list is accurate as of 12/6/18  9:21 AM.  Always use your most recent med list.                   Brand Name Dispense Instructions for use Diagnosis    acetaminophen 500 MG tablet    TYLENOL     Take 1,000 mg by mouth every 8 hours as needed        butalbital-acetaminophen-caffeine -40 MG tablet    FIORICET/ESGIC     Take 1 tablet by mouth. Take 1 tablet 3 times daily as needed for headache        ibuprofen 600 MG tablet    ADVIL/MOTRIN     Take 600 mg by mouth 4 times daily as needed        levETIRAcetam 500 MG tablet    KEPPRA    60 tablet    Take 1 tablet by mouth 2 times daily.    Supervision of other normal pregnancy       medroxyPROGESTERone 150 MG/ML IM injection    DEPO-PROVERA    0.9 mL    Inject 1 mL (150 mg) into the muscle every 3 months    Birth control       naproxen 500 MG tablet    NAPROSYN    30 tablet    Take 1 tablet (500 mg) by mouth 2 times daily as needed for moderate pain    Tooth pain       nicotine 21 MG/24HR 24 hr patch    NICODERM CQ    30 patch    Place 1 patch onto the skin every 24 hours    Encounter for smoking cessation counseling       penicillin G [id number] UNIT/ML injection    BICILLIN L-A    12 mL    Inject 4 mLs (2,400,000 Units) into the muscle every 7 days for 3 doses    Positive serology for syphilis

## 2018-12-06 NOTE — NURSING NOTE
I administered the following to Lorena Brooks.    MEDICATION: Bicillin LA 1.2  ROUTE: IM  SITE: LUQ, RUQ - Gluteus  DOSE: 4 mLs 2,400,000 units   LOT #: [id number]  :  [employer]  EXPIRATION DATE:  4/2021  NDC#: 46808-026-49     Was entire vial of medication used? Yes    Did the patient bring this medication to the clinic to be injected? No    Name of provider who requested the injection: Madeleine  Name of provider on site (faculty or community preceptor) at the time of performing the injection: Mariana Isbell MA

## 2018-12-17 ENCOUNTER — ALLIED HEALTH/NURSE VISIT (OUTPATIENT)
Dept: FAMILY MEDICINE | Facility: CLINIC | Age: 43
End: 2018-12-17
Payer: MEDICARE

## 2018-12-17 VITALS
HEART RATE: 69 BPM | DIASTOLIC BLOOD PRESSURE: 85 MMHG | RESPIRATION RATE: 18 BRPM | TEMPERATURE: 98.3 F | BODY MASS INDEX: 32.35 KG/M2 | SYSTOLIC BLOOD PRESSURE: 124 MMHG | OXYGEN SATURATION: 99 % | WEIGHT: 200.4 LBS

## 2018-12-17 DIAGNOSIS — A53.0 POSITIVE SEROLOGY FOR SYPHILIS: Primary | ICD-10-CM

## 2018-12-17 DIAGNOSIS — A53.0 POSITIVE SEROLOGY FOR SYPHILIS: ICD-10-CM

## 2018-12-17 NOTE — PROGRESS NOTES
Prior to injection, I verified the patient identity using patient's name and date of birth. Patient was instructed to report any adverse reaction to me immediately.    I administered the injection to Lorena Todd.    Was entire vial of medication used? Yes    Did the patient bring this medication to the clinic to be injected? No    Name of provider who requested the injection: Madeleine  Name of provider on site (faculty or community preceptor) at the time of performing the injection: Dain    Date of next injection: Final  Date of next office visit with provider to renew medication plan (must be seen annually):     Danika Isbell MA

## 2019-01-17 ENCOUNTER — ALLIED HEALTH/NURSE VISIT (OUTPATIENT)
Dept: FAMILY MEDICINE | Facility: CLINIC | Age: 44
End: 2019-01-17
Payer: MEDICARE

## 2019-01-17 VITALS
HEART RATE: 76 BPM | WEIGHT: 204 LBS | SYSTOLIC BLOOD PRESSURE: 108 MMHG | RESPIRATION RATE: 16 BRPM | TEMPERATURE: 97.9 F | DIASTOLIC BLOOD PRESSURE: 74 MMHG | BODY MASS INDEX: 32.93 KG/M2 | OXYGEN SATURATION: 98 %

## 2019-01-17 DIAGNOSIS — Z30.42 ENCOUNTER FOR SURVEILLANCE OF INJECTABLE CONTRACEPTIVE: Primary | ICD-10-CM

## 2019-01-17 RX ORDER — MEDROXYPROGESTERONE ACETATE 150 MG/ML
150 INJECTION, SUSPENSION INTRAMUSCULAR
Status: ACTIVE | OUTPATIENT
Start: 2019-01-17

## 2019-01-17 RX ADMIN — MEDROXYPROGESTERONE ACETATE 150 MG: 150 INJECTION, SUSPENSION INTRAMUSCULAR at 09:10

## 2019-01-17 NOTE — NURSING NOTE
Prior to injection, verified patient identity using patient's name and date of birth.  Due to injection administration, patient instructed to remain in clinic for 15 minutes  afterwards, and to report any adverse reaction to me immediately.    BP: Data Unavailable    LAST PAP/EXAM: No results found for: PAP  URINE HCG:not indicated    NEXT INJECTION DUE: 4/4/19 - 4/18/19         Drug Amount Wasted:  None.  Vial/Syringe: Single dose vial  Expiration Date:  4/2020    Danika Isbell MA

## 2019-04-25 ENCOUNTER — ALLIED HEALTH/NURSE VISIT (OUTPATIENT)
Dept: FAMILY MEDICINE | Facility: CLINIC | Age: 44
End: 2019-04-25
Payer: MEDICARE

## 2019-04-25 VITALS
TEMPERATURE: 98.4 F | HEART RATE: 68 BPM | SYSTOLIC BLOOD PRESSURE: 124 MMHG | DIASTOLIC BLOOD PRESSURE: 61 MMHG | WEIGHT: 212 LBS | RESPIRATION RATE: 18 BRPM | BODY MASS INDEX: 34.22 KG/M2

## 2019-04-25 DIAGNOSIS — Z30.42 ENCOUNTER FOR SURVEILLANCE OF INJECTABLE CONTRACEPTIVE: Primary | ICD-10-CM

## 2019-04-25 RX ADMIN — MEDROXYPROGESTERONE ACETATE 150 MG: 150 INJECTION, SUSPENSION INTRAMUSCULAR at 13:43

## 2019-04-25 NOTE — NURSING NOTE
I administered the following to Lorena Brooks.    MEDICATION: Medroxyprogesterone 150 mg  ROUTE: IM  SITE: Deltoid - Left  DOSE: 150mg  LOT #: [id number]  :  [employer]   EXPIRATION DATE:  05/2020  NDC#: [address]-3     Was entire vial of medication used? Yes    Did the patient bring this medication to the clinic to be injected? No    Name of provider who requested the injection: Dr. MARIA DEL CARMEN Strong  Name of provider on site (faculty or community preceptor) at the time of performing the injection: Dr. MARIA DEL CARMEN Strong    Pt was on time for injection, Pt asked to return 7/11/19 - 8/8/19 for next Depo.    Bisi Norman, CMA

## 2021-02-01 ENCOUNTER — OFFICE VISIT (OUTPATIENT)
Dept: FAMILY MEDICINE | Facility: CLINIC | Age: 46
End: 2021-02-01
Payer: MEDICARE

## 2021-02-01 VITALS
OXYGEN SATURATION: 97 % | DIASTOLIC BLOOD PRESSURE: 81 MMHG | WEIGHT: 230.4 LBS | RESPIRATION RATE: 16 BRPM | TEMPERATURE: 98.5 F | HEART RATE: 92 BPM | SYSTOLIC BLOOD PRESSURE: 122 MMHG | BODY MASS INDEX: 37.19 KG/M2

## 2021-02-01 DIAGNOSIS — F17.200 TOBACCO DEPENDENCE SYNDROME: ICD-10-CM

## 2021-02-01 DIAGNOSIS — N89.8 VAGINAL DISCHARGE: ICD-10-CM

## 2021-02-01 DIAGNOSIS — Z00.00 ROUTINE GENERAL MEDICAL EXAMINATION AT A HEALTH CARE FACILITY: Primary | ICD-10-CM

## 2021-02-01 DIAGNOSIS — B96.89 BV (BACTERIAL VAGINOSIS): ICD-10-CM

## 2021-02-01 DIAGNOSIS — N76.0 BV (BACTERIAL VAGINOSIS): ICD-10-CM

## 2021-02-01 DIAGNOSIS — Z11.3 SCREEN FOR STD (SEXUALLY TRANSMITTED DISEASE): ICD-10-CM

## 2021-02-01 DIAGNOSIS — E66.01 MORBID OBESITY (H): ICD-10-CM

## 2021-02-01 LAB
BACTERIA: NORMAL
CHOLEST SERPL-MCNC: 185 MG/DL
CLUE CELLS: NORMAL
FASTING?: ABNORMAL
HDLC SERPL-MCNC: 45 MG/DL
HIV 1+2 AB+HIV1 P24 AG SERPL QL IA: NEGATIVE
LDLC SERPL CALC-MCNC: 106 MG/DL
MOTILE TRICHOMONAS: NEGATIVE
ODOR: NORMAL
PH WET PREP: NORMAL (ref 3.8–4.5)
TRIGL SERPL-MCNC: 170 MG/DL
WBC WET PREP: NORMAL (ref 2–5)
YEAST: NORMAL

## 2021-02-01 PROCEDURE — 36415 COLL VENOUS BLD VENIPUNCTURE: CPT | Performed by: STUDENT IN AN ORGANIZED HEALTH CARE EDUCATION/TRAINING PROGRAM

## 2021-02-01 PROCEDURE — 87210 SMEAR WET MOUNT SALINE/INK: CPT | Performed by: STUDENT IN AN ORGANIZED HEALTH CARE EDUCATION/TRAINING PROGRAM

## 2021-02-01 PROCEDURE — 99213 OFFICE O/P EST LOW 20 MIN: CPT | Mod: GC | Performed by: STUDENT IN AN ORGANIZED HEALTH CARE EDUCATION/TRAINING PROGRAM

## 2021-02-01 NOTE — PROGRESS NOTES
Preceptor Attestation:    Patient seen and evaluated in person. I discussed the patient with the resident. I have verified the content of the note, which accurately reflects my assessment of the patient and the plan of care.   Supervising Physician:  Wu Peña MD.

## 2021-02-01 NOTE — PATIENT INSTRUCTIONS
Preventive Health Recommendations  Female Ages 40 to 49    Yearly exam:     See your health care provider every year in order to  1. Review health changes.   2. Discuss preventive care.    3. Review your medicines if your doctor prescribed any.      Get a Pap test every three years (unless you have an abnormal result and your provider advises testing more often).      If you get Pap tests with HPV test, you only need to test every 5 years, unless you have an abnormal result. You do not need a Pap test if your uterus was removed (hysterectomy) and you have not had cancer.      You should be tested each year for STDs (sexually transmitted diseases), if you're at risk.     Ask your doctor if you should have a mammogram.      Have a colonoscopy (test for colon cancer) if someone in your family has had colon cancer or polyps before age 50.       Have a cholesterol test every 5 years.       Have a diabetes test (fasting glucose) after age 45. If you are at risk for diabetes, you should have this test every 3 years.    Shots: Get a flu shot each year. Get a tetanus shot every 10 years.     Nutrition:     Eat at least 5 servings of fruits and vegetables each day.    Eat whole-grain bread, whole-wheat pasta and brown rice instead of white grains and rice.    Get adequate Calcium and Vitamin D.      Lifestyle    Exercise at least 150 minutes a week (an average of 30 minutes a day, 5 days a week). This will help you control your weight and prevent disease.    Limit alcohol to one drink per day.    No smoking.     Wear sunscreen to prevent skin cancer.    See your dentist every six months for an exam and cleaning.    21   MAMMO SCREENING  [facility]   [phone]  Fax Orders to [phone]     Order faxed to [phone], they will contact patient to schedule.     Lexie Buchanan

## 2021-02-01 NOTE — PROGRESS NOTES
Female Physical Note    Concerns today: thinks she needs a pap smear      ROS:  CONSTITUTIONAL: no fatigue, no unexpected change in weight  SKIN: no worrisome rashes, no worrisome moles, no worrisome lesions  EYES: no acute vision problems or changes  ENT: no ear problems, no mouth problems, no throat problems  RESP: no significant cough, no shortness of breath  CV: no chest pain, no palpitations, no new or worsening peripheral edema  GI: no nausea, no vomiting, no constipation, no diarrhea    Sexually Active: Yes and At risk sexual behavior?  Details: thinks partner has multiple partners, but she uses condoms  Sexual concerns: No   Contraception:Details: uses condoms   P: 1071  Menarche: Patient's last menstrual period was 2021 (within weeks). Menses: q 28 days  Lastin-5 days,  Normal  STD History: Pos  Last Pap Smear Date:  normal  Abnormal Pap History: None    Patient Active Problem List   Diagnosis     Congenital anomaly of cerebrovascular system     Dizziness and giddiness     Headache     Health Care Home     Seizure disorder (H)     Epilepsy (H)     S/P laparoscopic cholecystectomy     Tremor     Tobacco use disorder     Encounter for contraceptive management, unspecified type     Hx of seizure disorder     Positive serology for syphilis     Anemia     Aneurysm (H)     Cerebral AV malformation     Cholelithiasis     Need for vaccination     Vitamin D insufficiency     Morbid obesity (H)       Current Outpatient Medications   Medication Sig Dispense Refill     nicotine (NICOTROL) 10 MG inhaler Use 1 cartridge as needed for urge to smoke by puffing over course of 20min.  Use 6-16 cart/day; reduce number of cart/day over 6-12 weeks. 6 each 1     acetaminophen (TYLENOL) 500 MG tablet Take 1,000 mg by mouth every 8 hours as needed       butalbital-acetaminophen-caffeine (FIORICET, ESGIC) per tablet Take 1 tablet by mouth. Take 1 tablet 3 times daily as needed for headache       ibuprofen  (ADVIL/MOTRIN) 600 MG tablet Take 600 mg by mouth 4 times daily as needed       levETIRAcetam (KEPPRA) 500 MG tablet Take 1 tablet by mouth 2 times daily. 60 tablet 3     medroxyPROGESTERone (DEPO-PROVERA) 150 MG/ML injection Inject 1 mL (150 mg) into the muscle every 3 months 0.9 mL 0     naproxen (NAPROSYN) 500 MG tablet Take 1 tablet (500 mg) by mouth 2 times daily as needed for moderate pain 30 tablet 0     nicotine (NICODERM CQ) 21 MG/24HR 24 hr patch Place 1 patch onto the skin every 24 hours 30 patch 1       Past Medical History:   Diagnosis Date     Depression      GERD (gastroesophageal reflux disease)      H/O chlamydia infection      Personal history of arterial venous malformation (AVM)     pt had coiling surgery in 2006, now has chronic HAs     Seizure disorder (H)     pt sees Dr Street     Smoker         Family History     Problem (# of Occurrences) Relation (Name,Age of Onset)    No Known Problems (18) Mother, Father, Maternal Grandmother, Maternal Grandfather, Paternal Grandmother, Paternal Grandfather, Brother, Sister, Son, Daughter, Maternal Half-Brother, Maternal Half-Sister, Paternal Half-Brother, Paternal Half-Sister, Niece, Nephew, Cousin, Other       Negative family history of: Diabetes, Cancer, Heart Disease, Coronary Artery Disease, Hypertension, Hyperlipidemia, Cerebrovascular Disease, Breast Cancer, Colon Cancer, Prostate Cancer, Other Cancer, Depression, Anxiety Disorder, Mental Illness, Substance Abuse, Anesthesia Reaction, Asthma, Osteoporosis, Genetic Disorder, Thyroid Disease, Obesity, Unknown/Adopted          Problem List Medication List and Allergy List were reviewed.    Patient is an established patient of this clinic.    Social History     Tobacco Use     Smoking status: Current Every Day Smoker     Types: Cigarettes, Other     Smokeless tobacco: Never Used     Tobacco comment: E-cigarettes   Substance Use Topics     Alcohol use: No     Single  Children ? yes 1 child    Has  anyone hurt you physically, for example by pushing, hitting, slapping or kicking you or forcing you to have sex? Denies  Do you feel threatened or controlled by a partner, ex-partner or anyone in your life? Denies    RISK BEHAVIORS AND HEALTHY HABITS:  Tobacco Use/Smoking: Details smokes 1 ppd and started when she was 17  Other Drug Use: Details marijuana  Do you use alcohol? No  Diet (5-7 servings of fruits/veg daily): Yes   Exercise (30 min accumulated most days):Yes  Dental Care: No and would like list of dentist  Calcium 1500 mg/d:  Yes  Seat Belt Use: Yes     Cholesterol Level (>44 yo or at risk):  Recommended and patient accepted testing., Pap/HPV cotest every 5 years for women 30-65   Testing not indicated  and HIV screening:  Recommended and patient accepted testing.  Breast CA Screening (>41 yo or 10 y before 1st degree relative diagnosis): Recommended and patient accepted testing.      Immunization History   Administered Date(s) Administered     Flu, Unspecified 10/10/2012     Influenza (IIV3) PF 10/13/2011, 10/10/2012, 12/05/2013     Influenza Vaccine IM > 6 months Valent IIV4 10/28/2015, 10/16/2018     Influenza Vaccine, 6+MO IM (QUADRIVALENT W/PRESERVATIVES) 10/28/2015, 10/16/2018     TD (ADULT, 7+) 08/25/2007     Tdap (Adacel,Boostrix) 01/27/2012     Tdap (Adult) Unspecified Formulation 08/01/2006, 08/25/2007    Reviewed Immunization Record Today    EXAMINATION:   /81 (BP Location: Left arm, Patient Position: Sitting, Cuff Size: Adult Large)   Pulse 92   Temp 98.5  F (36.9  C) (Oral)   Resp 16   Wt 104.5 kg (230 lb 6.4 oz)   LMP 01/14/2021 (Within Weeks)   SpO2 97%   Breastfeeding No   BMI 37.19 kg/m    GENERAL: healthy, alert and no distress  EYES: Eyes grossly normal to inspection, extraocular movements - intact, and PERRL  HENT: ear canals- normal; TMs- normal; Nose and mouth covered by mask  NECK: no tenderness, no adenopathy, no asymmetry, no masses, no stiffness  RESP: lungs clear  to auscultation - no rales, no rhonchi, no wheezes  CV: regular rates and rhythm, normal S1 S2, no S3 or S4 and no murmur, no click or rub -  ABDOMEN: soft, no tenderness, no  hepatosplenomegaly, no masses, normal bowel sounds  MS: extremities- no gross deformities noted, no edema  SKIN: no suspicious lesions, no rashes  NEURO: strength and tone- normal, sensory exam- grossly normal, mentation- intact, speech- normal, reflexes- symmetric  PSYCH: Alert and oriented times 3; speech- coherent , normal rate and volume; able to articulate logical thoughts, able to abstract reason, no tangential thoughts, no hallucinations or delusions, affect- normal  LYMPHATICS: ant. cervical- normal, post. cervical- normal, axillary- normal, supraclavicular- normal, inguinal- normal    ASSESSMENT/PLAN:  Lorena was seen today for physical, gyn exam and medication reconciliation.    Diagnoses and all orders for this visit:    Routine general medical examination at a health care facility  -     Lipid Panel ([place])  -     PCS Use: Screening Digital Bilateral Mammogram; Future  -     Lipid [place] ([facility]) - Results > 1 hr    Vaginal discharge: copious and concerned about BV or trich.  -     Wet Prep ([facility])    Morbid obesity (H)  -     Lipid [place] ([facility]) - Results > 1 hr    Screen for STD (sexually transmitted disease)  -     Chlamydia/Gono Amplified ([facility]); Future  -     HIV Ag/Ab Screen [place] ([facility])  -     Syphilis Screen [place] ([facility]PR/VDRL) ([facility])    Tobacco dependence syndrome  -     nicotine (NICOTROL) 10 MG inhaler; Use 1 cartridge as needed for urge to smoke by puffing over course of 20min.  Use 6-16 cart/day; reduce number of cart/day over 6-12 weeks.      Elisa Milner MD PGY3

## 2021-02-03 ENCOUNTER — RECORDS - HEALTHEAST (OUTPATIENT)
Dept: SCHEDULING | Facility: CLINIC | Age: 46
End: 2021-02-03

## 2021-02-03 ENCOUNTER — RECORDS - HEALTHEAST (OUTPATIENT)
Dept: ADMINISTRATIVE | Facility: OTHER | Age: 46
End: 2021-02-03

## 2021-02-03 DIAGNOSIS — Z12.31 VISIT FOR SCREENING MAMMOGRAM: ICD-10-CM

## 2021-02-04 LAB — T PALLIDUM AB SER QL AGGL: REACTIVE

## 2021-02-04 RX ORDER — METRONIDAZOLE 7.5 MG/G
1 GEL VAGINAL DAILY
Qty: 35 G | Refills: 0 | Status: SHIPPED | OUTPATIENT
Start: 2021-02-04 | End: 2021-02-11

## 2021-05-29 ENCOUNTER — RECORDS - HEALTHEAST (OUTPATIENT)
Dept: ADMINISTRATIVE | Facility: CLINIC | Age: 46
End: 2021-05-29

## 2021-05-30 ENCOUNTER — RECORDS - HEALTHEAST (OUTPATIENT)
Dept: ADMINISTRATIVE | Facility: CLINIC | Age: 46
End: 2021-05-30

## 2021-05-31 ENCOUNTER — RECORDS - HEALTHEAST (OUTPATIENT)
Dept: ADMINISTRATIVE | Facility: CLINIC | Age: 46
End: 2021-05-31

## 2021-06-01 ENCOUNTER — RECORDS - HEALTHEAST (OUTPATIENT)
Dept: ADMINISTRATIVE | Facility: CLINIC | Age: 46
End: 2021-06-01

## 2021-06-02 ENCOUNTER — RECORDS - HEALTHEAST (OUTPATIENT)
Dept: ADMINISTRATIVE | Facility: CLINIC | Age: 46
End: 2021-06-02

## 2021-06-03 ENCOUNTER — RECORDS - HEALTHEAST (OUTPATIENT)
Dept: ADMINISTRATIVE | Facility: CLINIC | Age: 46
End: 2021-06-03

## 2021-09-12 ENCOUNTER — VIRTUAL VISIT (OUTPATIENT)
Dept: URGENT CARE | Facility: CLINIC | Age: 46
End: 2021-09-12
Payer: MEDICARE

## 2021-09-12 ENCOUNTER — LAB (OUTPATIENT)
Dept: URGENT CARE | Facility: URGENT CARE | Age: 46
End: 2021-09-12
Attending: NURSE PRACTITIONER
Payer: MEDICARE

## 2021-09-12 DIAGNOSIS — R43.2 LOSS OF TASTE: Primary | ICD-10-CM

## 2021-09-12 DIAGNOSIS — R43.2 LOSS OF TASTE: ICD-10-CM

## 2021-09-12 LAB — SARS-COV-2 RNA RESP QL NAA+PROBE: POSITIVE

## 2021-09-12 PROCEDURE — U0005 INFEC AGEN DETEC AMPLI PROBE: HCPCS

## 2021-09-12 PROCEDURE — 99441 PR PHYSICIAN TELEPHONE EVALUATION 5-10 MIN: CPT | Mod: 95

## 2021-09-12 PROCEDURE — U0003 INFECTIOUS AGENT DETECTION BY NUCLEIC ACID (DNA OR RNA); SEVERE ACUTE RESPIRATORY SYNDROME CORONAVIRUS 2 (SARS-COV-2) (CORONAVIRUS DISEASE [COVID-19]), AMPLIFIED PROBE TECHNIQUE, MAKING USE OF HIGH THROUGHPUT TECHNOLOGIES AS DESCRIBED BY CMS-2020-01-R: HCPCS

## 2021-09-12 NOTE — PATIENT INSTRUCTIONS
Please call [phone] to schedule your Covid test.  Test results are typically available 24 hours after testing is completed.  If your test is positive you will be contacted by someone from [facility].  Please quarantine until you know your test results.

## 2021-09-12 NOTE — PROGRESS NOTES
Lorena is a 46 year old who is being evaluated via a billable telephone visit.      What phone number would you like to be contacted at?  [phone]  How would you like to obtain your AVS? Mail a copy    Assessment & Plan     Loss of taste    - Symptomatic COVID-19 Virus (Coronavirus) by PCR; Future      10 minutes spent on the date of the encounter doing chart review, patient visit and documentation         See Patient Instructions    No follow-ups on file.    Virtual Urgent Care  [facility]    Subjective   Lorena is a 46 year old who presents for the following health issues     HPI     46-year-old female presents to virtual urgent care via telephone visit for Covid concern.  States she lost her sense of taste and smell 3 days ago.  Has had some body aches to the lower legs which have slightly gotten better.  Has no appetite but no vomiting or diarrhea.  Thinks she was exposed to her daughter who may have Covid, patient has not had her Covid vaccine.    Review of Systems   Constitutional, HEENT, cardiovascular, pulmonary, gi and gu systems are negative, except as otherwise noted.      Objective           Vitals:  No vitals were obtained today due to virtual visit.    Physical Exam   healthy, alert and no distress  PSYCH: Alert and oriented times 3; coherent speech, normal   rate and volume, able to articulate logical thoughts, able   to abstract reason, no tangential thoughts, no hallucinations   or delusions  Her affect is normal  RESP: No cough, no audible wheezing, able to talk in full sentences  Remainder of exam unable to be completed due to telephone visits            Phone call duration: 6 minutes

## 2021-09-13 ENCOUNTER — TELEPHONE (OUTPATIENT)
Dept: EMERGENCY MEDICINE | Facility: CLINIC | Age: 46
End: 2021-09-13

## 2021-09-13 NOTE — TELEPHONE ENCOUNTER
Coronavirus (COVID-19) Notification    Reason for call  Notify of POSITIVE  COVID-19 lab result, assess symptoms,  review [facility] recommendations    Lab Result   Lab test for 2019-nCoV rRt-PCR or SARS-COV-2 PCR  Oropharyngeal AND/OR nasopharyngeal swabs were POSITIVE for 2019-nCoV RNA [OR] SARS-COV-2 RNA (COVID-19) RNA     We have been unable to reach Patient by phone at this time to notify of their Positive COVID-19 result.  Left voicemail message requesting a call back to [phone] [facility] for results.        POSITIVE COVID-19 Letter sent.    Renetta Montgomery LPN

## 2021-09-14 NOTE — TELEPHONE ENCOUNTER
"-Coronavirus (COVID-19) Notification    Caller Name (Patient, parent, daughter/son, grandparent, etc)  Patient    Reason for call  Notify of Positive Coronavirus (COVID-19) lab results, assess symptoms,  review  [facility] recommendations    Lab Result    Lab test:  2019-nCoV rRt-PCR or SARS-CoV-2 PCR    Oropharyngeal AND/OR nasopharyngeal swabs is POSITIVE for 2019-nCoV RNA/SARS-COV-2 PCR (COVID-19 virus)    RN Recommendations/Instructions per [facility] Coronavirus COVID-19 recommendations    Brief introduction script  Introduce self then review script:  \"I am calling on behalf of [employer].  We were notified that your Coronavirus test (COVID-19) for was POSITIVE for the virus.  I have some information to relay to you but first I wanted to mention that the [place] Dept of Health will be contacting you shortly [it's possible MD already called Patient] to talk to you more about how you are feeling and other people you have had contact with who might now also have the virus.  Also,  [employer] [facility] is Partnering with the [facility] for Covid-19 research, you may be contacted directly by research staff.\"    Assessment (Inquire about Patient's current symptoms)   Assessment   Current Symptoms at time of phone call: (if no symptoms, document No symptoms] No taste, no smell and no appetite   Symptoms onset (if applicable) 9/11/2021     If at time of call, Patients symptoms hare worsened, the Patient should contact 911 or have someone drive them to Emergency Dept promptly:      If Patient calling 911, inform 911 personal that you have tested positive for the Coronavirus (COVID-19).  Place mask on and await 911 to arrive.    If Emergency Dept, If possible, please have another adult drive you to the Emergency Dept but you need to wear mask when in contact with other people.      Monoclonal Antibody Administration    You may be eligible to receive a new treatment with a monoclonal antibody for " "preventing hospitalization in patients at high risk for complications from COVID-19.   This medication is still experimental and available on a limited basis; it is given through an IV and must be given at an infusion center. Please note that not all people who are eligible will receive the medication since it is in limited supply.     Are you interested in being considered for this medication?  No.   Does the patient fit the criteria: No    If patient qualifies based on above criteria:  \"You will be contacted if you are selected to receive this treatment in the next 1-2 business days.   This is time sensitive and if you are not selected in the next 1-2 business days, you will not receive the medication.  If you do not receive a call to schedule, you have not been selected.\"      Review information with Patient    Your result was positive. This means you have COVID-19 (coronavirus).  We have sent you a letter that reviews the information that I'll be reviewing with you now.    How can I protect others?    If you have symptoms: stay home and away from others (self-isolate) until:    You've had no fever--and no medicine that reduces fever--for 1 full day (24 hours). And       Your other symptoms have gotten better. For example, your cough or breathing has improved. And     At least 10 days have passed since your symptoms started. (If you've been told by a doctor that you have a weak immune system, wait 20 days.)     If you don't have symptoms: Stay home and away from others (self-isolate) until at least 10 days have passed since your first positive COVID-19 test. (Date test collected)    During this time:    Stay in your own room, including for meals. Use your own bathroom if you can.    Stay away from others in your home. No hugging, kissing or shaking hands. No visitors.     Don't go to work, school or anywhere else.     Clean  high touch  surfaces often (doorknobs, counters, handles, etc.). Use a household cleaning " spray or wipes. You'll find a full list on the EPA website at www.epa.gov/pesticide-registration/list-n-disinfectants-use-against-sars-cov-2.     Cover your mouth and nose with a mask, tissue or other face covering to avoid spreading germs.    Wash your hands and face often with soap and water.    Make a list of people you have been in close contact with recently, even if either of you wore a face covering.   ; Start your list from 2 days before you became ill or had a positive test.  ; Include anyone that was within 6 feet of you for a cumulative total of 15 minutes or more in 24 hours. (Example: if you sat next to Shola for 5 minutes in the morning and 10 minutes in the afternoon, then you were in close contact for 15 minutes total that day. Shola would be added to your list.)    A public health worker will call or text you. It is important that you answer. They will ask you questions about possible exposures to COVID-19, such as people you have been in direct contact with and places you have visited.    Tell the people on your list that you have COVID-19; they should stay away from others for 14 days starting from the last time they were in contact with you (unless you are told something different from a public health worker).     Caregivers in these groups are at risk for severe illness due to COVID-19:  o People 65 years and older  o People who live in a nursing home or long-term care facility  o People with chronic disease (lung, heart, cancer, diabetes, kidney, liver, immunologic)  o People who have a weakened immune system, including those who:  - Are in cancer treatment  - Take medicine that weakens the immune system, such as corticosteroids  - Had a bone marrow or organ transplant  - Have an immune deficiency  - Have poorly controlled HIV or AIDS  - Are obese (body mass index of 40 or higher)  - Smoke regularly    Caregivers should wear gloves while washing dishes, handling laundry and cleaning bedrooms and  bathrooms.    Wash and dry laundry with special caution. Don't shake dirty laundry, and use the warmest water setting you can.    If you have a weakened immune system, ask your doctor about other actions you should take.    For more tips, go to www.cdc.gov/coronavirus/2019-ncov/downloads/10Things.pdf.    You should not go back to work until you meet the guidelines above for ending your home isolation. You don't need to be retested for COVID-19 before going back to work--studies show that you won't spread the virus if it's been at least 10 days since your symptoms started (or 20 days, if you have a weak immune system).    Employers: This document serves as formal notice of your employee's medical guidelines for going back to work. They must meet the above guidelines before going back to work in person.    How can I take care of myself?    1. Get lots of rest. Drink extra fluids (unless a doctor has told you not to).    2. Take Tylenol (acetaminophen) for fever or pain. If you have liver or kidney problems, ask your family doctor if it's okay to take Tylenol.     Take either:     650 mg (two 325 mg pills) every 4 to 6 hours, or     1,000 mg (two 500 mg pills) every 8 hours as needed.     Note: Don't take more than 3,000 mg in one day. Acetaminophen is found in many medicines (both prescribed and over-the-counter medicines). Read all labels to be sure you don't take too much.    For children, check the Tylenol bottle for the right dose (based on their age or weight).    3. If you have other health problems (like cancer, heart failure, an organ transplant or severe kidney disease): Call your specialty clinic if you don't feel better in the next 2 days.    4. Know when to call 911: Emergency warning signs include:    Trouble breathing or shortness of breath    Pain or pressure in the chest that doesn't go away    Feeling confused like you haven't felt before, or not being able to wake up    Bluish-colored lips or  face    5. Sign up for [employer]. We know it's scary to hear that you have COVID-19. We want to track your symptoms to make sure you're okay over the next 2 weeks. Please look for an email from [employer]--this is a free, online program that we'll use to keep in touch. To sign up, follow the link in the email. Learn more at [email or web address].    Where can I get more information?    [facility]: [email or web address]    Coronavirus Basics: www.[email or web address]./diseases/coronavirus/basics.html    What to Do If You're Sick: www.cdc.gov/coronavirus/2019-ncov/about/steps-when-sick.html    Ending Home Isolation: www.cdc.gov/coronavirus/2019-ncov/hcp/disposition-in-home-patients.html     Caring for Someone with COVID-19: www.cdc.gov/coronavirus/2019-ncov/if-you-are-sick/care-for-someone.html     [facility] clinical trials (COVID-19 research studies): [email or web address]     A Positive COVID-19 letter will be sent via [employer] or the mail. (Exception, no letters sent to Presurgerical/Preprocedure Patients)    Renetta Montgomery LPN

## 2021-09-25 ENCOUNTER — HEALTH MAINTENANCE LETTER (OUTPATIENT)
Age: 46
End: 2021-09-25

## 2022-03-12 ENCOUNTER — HEALTH MAINTENANCE LETTER (OUTPATIENT)
Age: 47
End: 2022-03-12

## 2022-04-08 ENCOUNTER — OFFICE VISIT (OUTPATIENT)
Dept: FAMILY MEDICINE | Facility: CLINIC | Age: 47
End: 2022-04-08
Payer: MEDICARE

## 2022-04-08 VITALS
WEIGHT: 209 LBS | RESPIRATION RATE: 16 BRPM | DIASTOLIC BLOOD PRESSURE: 72 MMHG | BODY MASS INDEX: 33.73 KG/M2 | HEART RATE: 71 BPM | SYSTOLIC BLOOD PRESSURE: 113 MMHG | TEMPERATURE: 98.6 F | OXYGEN SATURATION: 95 %

## 2022-04-08 DIAGNOSIS — Z11.3 SCREEN FOR STD (SEXUALLY TRANSMITTED DISEASE): ICD-10-CM

## 2022-04-08 DIAGNOSIS — N76.0 BACTERIAL VAGINITIS: ICD-10-CM

## 2022-04-08 DIAGNOSIS — Z00.01 ENCOUNTER FOR ROUTINE ADULT MEDICAL EXAM WITH ABNORMAL FINDINGS: ICD-10-CM

## 2022-04-08 DIAGNOSIS — Z00.00 ROUTINE GENERAL MEDICAL EXAMINATION AT A HEALTH CARE FACILITY: Primary | ICD-10-CM

## 2022-04-08 DIAGNOSIS — Z30.013 ENCOUNTER FOR PRESCRIPTION FOR DEPO-PROVERA: ICD-10-CM

## 2022-04-08 DIAGNOSIS — B96.89 BACTERIAL VAGINOSIS: ICD-10-CM

## 2022-04-08 DIAGNOSIS — N89.8 VAGINAL DISCHARGE: Primary | ICD-10-CM

## 2022-04-08 DIAGNOSIS — Z13.220 SCREENING FOR LIPID DISORDERS: ICD-10-CM

## 2022-04-08 DIAGNOSIS — N76.0 BACTERIAL VAGINOSIS: ICD-10-CM

## 2022-04-08 DIAGNOSIS — Z00.00 ROUTINE GENERAL MEDICAL EXAMINATION AT A HEALTH CARE FACILITY: ICD-10-CM

## 2022-04-08 DIAGNOSIS — Z12.31 VISIT FOR SCREENING MAMMOGRAM: ICD-10-CM

## 2022-04-08 DIAGNOSIS — B96.89 BACTERIAL VAGINITIS: ICD-10-CM

## 2022-04-08 DIAGNOSIS — Z12.11 SCREENING FOR COLON CANCER: ICD-10-CM

## 2022-04-08 LAB
ALBUMIN SERPL-MCNC: 3.5 G/DL (ref 3.5–5)
ALP SERPL-CCNC: 70 U/L (ref 45–120)
ALT SERPL W P-5'-P-CCNC: <9 U/L (ref 0–45)
ANION GAP SERPL CALCULATED.3IONS-SCNC: 11 MMOL/L (ref 5–18)
AST SERPL W P-5'-P-CCNC: 12 U/L (ref 0–40)
BILIRUB SERPL-MCNC: 0.5 MG/DL (ref 0–1)
BUN SERPL-MCNC: 9 MG/DL (ref 8–22)
CALCIUM SERPL-MCNC: 9 MG/DL (ref 8.5–10.5)
CHLORIDE BLD-SCNC: 106 MMOL/L (ref 98–107)
CHOLEST SERPL-MCNC: 178 MG/DL
CLUE CELLS: PRESENT
CO2 SERPL-SCNC: 23 MMOL/L (ref 22–31)
CREAT SERPL-MCNC: 0.68 MG/DL (ref 0.6–1.1)
FASTING STATUS PATIENT QL REPORTED: ABNORMAL
GFR SERPL CREATININE-BSD FRML MDRD: >90 ML/MIN/1.73M2
GLUCOSE BLD-MCNC: 86 MG/DL (ref 70–125)
HBA1C MFR BLD: 5.4 % (ref 0–5.6)
HDLC SERPL-MCNC: 44 MG/DL
LDLC SERPL CALC-MCNC: 115 MG/DL
POTASSIUM BLD-SCNC: 3.9 MMOL/L (ref 3.5–5)
PROT SERPL-MCNC: 6.8 G/DL (ref 6–8)
SODIUM SERPL-SCNC: 140 MMOL/L (ref 136–145)
TRICHOMONAS, WET PREP: ABNORMAL
TRIGL SERPL-MCNC: 94 MG/DL
WBC'S/HIGH POWER FIELD, WET PREP: ABNORMAL
YEAST, WET PREP: ABNORMAL

## 2022-04-08 PROCEDURE — 83036 HEMOGLOBIN GLYCOSYLATED A1C: CPT | Performed by: STUDENT IN AN ORGANIZED HEALTH CARE EDUCATION/TRAINING PROGRAM

## 2022-04-08 PROCEDURE — 96372 THER/PROPH/DIAG INJ SC/IM: CPT | Performed by: FAMILY MEDICINE

## 2022-04-08 PROCEDURE — 91301 COVID-19,PF,MODERNA (18+ YRS PRIMARY SERIES .5ML): CPT | Performed by: STUDENT IN AN ORGANIZED HEALTH CARE EDUCATION/TRAINING PROGRAM

## 2022-04-08 PROCEDURE — 0011A COVID-19,PF,MODERNA (18+ YRS PRIMARY SERIES .5ML): CPT | Performed by: STUDENT IN AN ORGANIZED HEALTH CARE EDUCATION/TRAINING PROGRAM

## 2022-04-08 PROCEDURE — 86803 HEPATITIS C AB TEST: CPT | Performed by: STUDENT IN AN ORGANIZED HEALTH CARE EDUCATION/TRAINING PROGRAM

## 2022-04-08 PROCEDURE — 80053 COMPREHEN METABOLIC PANEL: CPT | Performed by: STUDENT IN AN ORGANIZED HEALTH CARE EDUCATION/TRAINING PROGRAM

## 2022-04-08 PROCEDURE — 87491 CHLMYD TRACH DNA AMP PROBE: CPT | Performed by: STUDENT IN AN ORGANIZED HEALTH CARE EDUCATION/TRAINING PROGRAM

## 2022-04-08 PROCEDURE — 87210 SMEAR WET MOUNT SALINE/INK: CPT | Performed by: STUDENT IN AN ORGANIZED HEALTH CARE EDUCATION/TRAINING PROGRAM

## 2022-04-08 PROCEDURE — 99396 PREV VISIT EST AGE 40-64: CPT | Mod: 25 | Performed by: STUDENT IN AN ORGANIZED HEALTH CARE EDUCATION/TRAINING PROGRAM

## 2022-04-08 PROCEDURE — 99213 OFFICE O/P EST LOW 20 MIN: CPT | Mod: 25 | Performed by: STUDENT IN AN ORGANIZED HEALTH CARE EDUCATION/TRAINING PROGRAM

## 2022-04-08 PROCEDURE — 36415 COLL VENOUS BLD VENIPUNCTURE: CPT | Performed by: STUDENT IN AN ORGANIZED HEALTH CARE EDUCATION/TRAINING PROGRAM

## 2022-04-08 PROCEDURE — 87591 N.GONORRHOEAE DNA AMP PROB: CPT | Performed by: STUDENT IN AN ORGANIZED HEALTH CARE EDUCATION/TRAINING PROGRAM

## 2022-04-08 PROCEDURE — 80061 LIPID PANEL: CPT | Performed by: STUDENT IN AN ORGANIZED HEALTH CARE EDUCATION/TRAINING PROGRAM

## 2022-04-08 RX ORDER — METRONIDAZOLE 500 MG/1
500 TABLET ORAL 2 TIMES DAILY
Qty: 14 TABLET | Refills: 0 | Status: SHIPPED | OUTPATIENT
Start: 2022-04-08 | End: 2022-04-15

## 2022-04-08 RX ORDER — MEDROXYPROGESTERONE ACETATE 150 MG/ML
150 INJECTION, SUSPENSION INTRAMUSCULAR
Status: ACTIVE | OUTPATIENT
Start: 2022-04-08

## 2022-04-08 RX ADMIN — MEDROXYPROGESTERONE ACETATE 150 MG: 150 INJECTION, SUSPENSION INTRAMUSCULAR at 16:54

## 2022-04-08 NOTE — NURSING NOTE
Clinic Administered Medication Documentation    Administrations This Visit     medroxyPROGESTERone (DEPO-PROVERA) injection 150 mg     Admin Date  04/08/2022 Action  Given Dose  150 mg Route  Intramuscular Site  Left Gluteus Erik Administered By  Remy Su CMA    Ordering Provider: Lloyd Marshall MD    Patient Supplied?: No                  Depo Provera Documentation    URINE HCG: negative    Depo-Provera Standing Order inclusion/exclusion criteria reviewed.   Patient meets: inclusion criteria     BP: 113/72  LAST PAP/EXAM: No results found for: PAP    Prior to injection, verified patient identity using patient's name and date of birth. Medication was administered. Please see MAR and medication order for additional information.     Was entire vial of medication used? Yes  Vial/Syringe: Single dose vial  Expiration Date:  9/1/20/23    Patient instructed to remain in clinic for 15 minutes.  NEXT INJECTION DUE: 6/25/22 - 7/9/22

## 2022-04-08 NOTE — PROGRESS NOTES
Female Physical Note    Concerns today:     Lorena has noticed increased malodorous discharge and is concerned about STI. No itching, or sking changes noted. She has had intermittent unprotected sex with a recent partner and would like to get STI testing completed.    She has recently started seeing a new partner and inquires about starting Depo-Provera shots for birth control. Her last period was in March. Her periods are still regular and last 3-4 days. She is currently having protected sex with this new partner.    She also notes that she has noticed her urine is dark. She drinks 2-3 bottles of coca-cola and coffee throughout the day. Her stools are normal brown and not he-colored.     ROS:  CONSTITUTIONAL: no fatigue, no unexpected change in weight  SKIN: no worrisome rashes  EYES: no acute vision problems or changes  ENT: no ear problems, no mouth problems, no throat problems  RESP: no significant cough, no shortness of breath  CV: no chest pain, no new or worsening peripheral edema  GI: no nausea, no vomiting, no constipation, no diarrhea    Sexually Active: Yes  Sexual concerns: No   Contraception:Depo-Provera   P: 1  Menarche: Age 13 or 14  Patient's last menstrual period was 2022 (approximate). Menses: q 28 days  Lasting: 3-4 days,  Heavy  STD History: Pos  Last Pap Smear Date:  normal  Abnormal Pap History: None    Patient Active Problem List   Diagnosis     Congenital anomaly of cerebrovascular system     Dizziness and giddiness     Headache     Health Care Home     Seizure disorder (H)     Epilepsy (H)     S/P laparoscopic cholecystectomy     Tremor     Tobacco use disorder     Encounter for contraceptive management, unspecified type     Hx of seizure disorder     Positive serology for syphilis     Anemia     Aneurysm (H)     Cerebral AV malformation     Cholelithiasis     Need for vaccination     Vitamin D insufficiency     Morbid obesity (H)       Current Outpatient Medications    Medication Sig Dispense Refill     levETIRAcetam (KEPPRA) 500 MG tablet Take 1 tablet by mouth 2 times daily. 60 tablet 3     acetaminophen (TYLENOL) 500 MG tablet Take 1,000 mg by mouth every 8 hours as needed       butalbital-acetaminophen-caffeine (FIORICET, ESGIC) per tablet Take 1 tablet by mouth. Take 1 tablet 3 times daily as needed for headache       ibuprofen (ADVIL/MOTRIN) 600 MG tablet Take 600 mg by mouth 4 times daily as needed       medroxyPROGESTERone (DEPO-PROVERA) 150 MG/ML injection Inject 1 mL (150 mg) into the muscle every 3 months 0.9 mL 0     naproxen (NAPROSYN) 500 MG tablet Take 1 tablet (500 mg) by mouth 2 times daily as needed for moderate pain 30 tablet 0     nicotine (NICODERM CQ) 21 MG/24HR 24 hr patch Place 1 patch onto the skin every 24 hours 30 patch 1     nicotine (NICOTROL) 10 MG inhaler Use 1 cartridge as needed for urge to smoke by puffing over course of 20min.  Use 6-16 cart/day; reduce number of cart/day over 6-12 weeks. 6 each 1       Past Medical History:   Diagnosis Date     Depression      GERD (gastroesophageal reflux disease)      H/O chlamydia infection      Personal history of arterial venous malformation (AVM)     pt had coiling surgery in 2006, now has chronic HAs     Seizure disorder (H)     pt sees Dr Street     Smoker         Family History     Problem (# of Occurrences) Relation (Name,Age of Onset)    No Known Problems (18) Mother, Father, Maternal Grandmother, Maternal Grandfather, Paternal Grandmother, Paternal Grandfather, Brother, Sister, Son, Daughter, Maternal Half-Brother, Maternal Half-Sister, Paternal Half-Brother, Paternal Half-Sister, Niece, Nephew, Cousin, Other       Negative family history of: Diabetes, Cancer, Heart Disease, Coronary Artery Disease, Hypertension, Hyperlipidemia, Cerebrovascular Disease, Breast Cancer, Colon Cancer, Prostate Cancer, Other Cancer, Depression, Anxiety Disorder, Mental Illness, Substance Abuse, Anesthesia Reaction,  Asthma, Osteoporosis, Genetic Disorder, Thyroid Disease, Obesity, Unknown/Adopted          Problem List Medication List and Allergy List were reviewed.    Patient is an established patient of this clinic..    Social History     Tobacco Use     Smoking status: Current Every Day Smoker     Types: Cigarettes, Other     Smokeless tobacco: Never Used     Tobacco comment: E-cigarettes   Substance Use Topics     Alcohol use: No     Single  Children ? yes, Daughter 8 Anna Mason    Has anyone hurt you physically, for example by pushing, hitting, slapping or kicking you or forcing you to have sex? Denies  Do you feel threatened or controlled by a partner, ex-partner or anyone in your life? Denies    RISK BEHAVIORS AND HEALTHY HABITS:  Tobacco Use/Smoking: Details- half a pack a day for 30 years  Illicit Drug Use:  Details- Marijuana 7g on 1.5 days  Do you use alcohol? No  Diet (5-7 servings of fruits/veg daily): 2-3   Exercise (30 min accumulated most days):Yes; Light stretching no aerobic exercise  Dental Care: Yes   Calcium 1500 mg/d:  No  Seat Belt Use: Yes     Cholesterol Level (>44 yo or at risk):  Recommended and patient accepted testing.  Colon CA Screening (>50-75 ):  Recommended and patient accepted testing.      Immunization History   Administered Date(s) Administered     Flu, Unspecified 10/10/2012     Influenza (IIV3) PF 10/13/2011, 10/10/2012, 12/05/2013     Influenza Vaccine IM > 6 months Valent IIV4 (Alfuria,Fluzone) 10/28/2015, 10/16/2018     Influenza Vaccine, 6+MO IM (QUADRIVALENT W/PRESERVATIVES) 10/28/2015, 10/16/2018     TD (ADULT, 7+) 08/25/2007     Tdap (Adacel,Boostrix) 01/27/2012     Tdap (Adult) Unspecified Formulation 08/01/2006, 08/25/2007    Reviewed Immunization Record Today    EXAMINATION:   /72   Pulse 71   Temp 98.6  F (37  C) (Oral)   Resp 16   Wt 94.8 kg (209 lb)   LMP 03/25/2022 (Approximate)   SpO2 95%   Breastfeeding No   BMI 33.73 kg/m       Physical Exam    GENERAL:  healthy, alert and no distress  EYES: Eyes grossly normal to inspection, extraocular movements - intact, and PERRL  HENT: ear canals- normal; TMs- normal; Nose- normal; Mouth- no ulcers, no lesions  NECK: no tenderness, no adenopathy, no asymmetry, no masses, no stiffness; thyroid- normal to palpation  RESP: lungs clear to auscultation - no rales, no rhonchi, no wheezes  CV: regular rates and rhythm, normal S1 S2, no S3 or S4 and no murmur,   ABDOMEN: distended, soft, no tenderness, normal bowel sounds  MS: extremities- no gross deformities noted, no LE edema  SKIN: no suspicious lesions, no rashes  NEURO: strength and tone- normal, sensory exam- grossly normal, mentation- intact, speech- normal, reflexes- symmetric  BACK: no CVA tenderness, no paralumbar tenderness  - female: pt declined today  PSYCH: Alert and oriented times 3; speech- coherent , normal rate and volume; able to articulate logical thoughts, able to abstract reason, no tangential thoughts, no hallucinations or delusions, affect- normal  LYMPHATICS: ant. cervical- normal, post. cervical- normal    ASSESSMENT:  1. Health Care Maintenance  Spoke about preventive cancer screening and vaccination, which she was willing to schedule and receive, respectively. Discussed cigarette smoking and she feels she can quit with the patches and will try that before trying other medications. Will defer conversations about headache management and marijuana use to next visit.   -Tdap, PPSV23 ordered this visit, but patient left before getting this.  She plans to return with her daughter for daughters vaccinations.  -Colonoscopy ordered    2. Desires contraception  Last known period was two weeks ago. No concern for pregnancy. Has had protected sex and is wanting to go on Depo-Provera. Discussed side-effects of shot and encouraged also starting calcium-vitamin D supplement to promote bone health while on Depo-Provera.   -Vitamin D with calcium  -Depo-Provera  today    3. Bacterial Vaginosis  Wet Prep grew Clue cells. Will treat her BV with Metronidazole 500 mg BID for 7 days.     4.  Vaginal discharge  -Treat bacterial vaginosis as above  -GC/chlamydia pending    PLAN:  Plan:  1. For bacterial vaginosis take Metronidazole twice daily for 7 days.   2. Start Calcium-Vitamin D daily  3. [facility] for follow up for other lab results.  4. Schedule mammogram and colonscopy  5. Order Covid vaccine, Pneumovax, TDap,   6. Please return to clinic in 4 weeks and schedule you and your daughter, Anna, for a visit with Dr. Ifrah Bobo, MS3      Resident attestation  I have seen and examined the patient. I have discussed the case with Student Doctor, Hardik Bobo, and agree with the findings, assessment and plan.  Sally Rg MD  PGY2  4/12/2022

## 2022-04-08 NOTE — PROGRESS NOTES
Preceptor Attestation:    I discussed the patient with the resident and evaluated the patient in person. I have verified the content of the note, which accurately reflects my assessment of the patient and the plan of care.   Supervising Physician:  Lloyd Marshall MD.

## 2022-04-08 NOTE — PATIENT INSTRUCTIONS
Ms. Brooks,    It was a pleasure to meet you today in clinic.     Plan:  1. For bacterial vaginosis take Metronidazole twice daily for 7 days.   2. Start Calcium-Vitamin D daily  3. We will use MyChart to follow up for other lab results.  4. Schedule mammogram and colonscopy  5. Please return to clinic in 4 weeks and schedule you and your daughter, Anna, for a visit with Dr. Rg.      Preventive Health Recommendations  Female Ages 40 to 49    Yearly exam:   See your health care provider every year in order to  Review health changes.   Discuss preventive care.    Review your medicines if your doctor prescribed any.    Get a Pap test every three years (unless you have an abnormal result and your provider advises testing more often).    If you get Pap tests with HPV test, you only need to test every 5 years, unless you have an abnormal result. You do not need a Pap test if your uterus was removed (hysterectomy) and you have not had cancer.    You should be tested each year for STDs (sexually transmitted diseases), if you're at risk.   Ask your doctor if you should have a mammogram.    Have a colonoscopy (test for colon cancer) if someone in your family has had colon cancer or polyps before age 50.     Have a cholesterol test every 5 years.     Have a diabetes test (fasting glucose) after age 45. If you are at risk for diabetes, you should have this test every 3 years.    Shots: Get a flu shot each year. Get a tetanus shot every 10 years.     Nutrition:   Eat at least 5 servings of fruits and vegetables each day.  Eat whole-grain bread, whole-wheat pasta and brown rice instead of white grains and rice.  Get adequate Calcium and Vitamin D.      Lifestyle  Exercise at least 150 minutes a week (an average of 30 minutes a day, 5 days a week). This will help you control your weight and prevent disease.  Limit alcohol to one drink per day.  No smoking.   Wear sunscreen to prevent skin cancer.  See your dentist every  six months for an exam and cleaning.        04/10/22   GASTROENTEROLOGY REFERRAL  [facility]  Phone [phone]  Fax: [phone]    Online referral placed with [facility] who will contact patient to schedule.     Radha Bliss

## 2022-04-09 LAB
C TRACH DNA SPEC QL NAA+PROBE: NEGATIVE
N GONORRHOEA DNA SPEC QL NAA+PROBE: NEGATIVE

## 2022-04-11 LAB — HCV AB SERPL QL IA: NEGATIVE

## 2022-05-06 ENCOUNTER — IMMUNIZATION (OUTPATIENT)
Dept: FAMILY MEDICINE | Facility: CLINIC | Age: 47
End: 2022-05-06
Attending: FAMILY MEDICINE
Payer: MEDICARE

## 2022-05-06 PROCEDURE — 99207 PR NO CHARGE LOS: CPT

## 2022-05-06 PROCEDURE — 0012A PR COVID VAC MODERNA 100 MCG/0.5 ML IM: CPT

## 2022-05-06 PROCEDURE — 91301 PR COVID VAC MODERNA 100 MCG/0.5 ML IM: CPT

## 2022-05-16 ENCOUNTER — TELEPHONE (OUTPATIENT)
Dept: FAMILY MEDICINE | Facility: CLINIC | Age: 47
End: 2022-05-16
Payer: MEDICARE

## 2022-05-16 NOTE — TELEPHONE ENCOUNTER
Patient wanting another depo shot to help end her period that she has been on since her last injection on 4/8/22. Patient schedule to be seen on 5/17 to discuss getting another shot    Note routed to   /KUSUM

## 2022-05-16 NOTE — TELEPHONE ENCOUNTER
[facility] phone call message-patient reporting a symptom:     Symptom:  Is on the depo and has been  bleeding for over a month     Same Day Visit Offered: would like to talk to a nurse     Additional comments:       OK to leave message on voice mail? Yes    Primary language: English      needed? No    Call taken on May 16, 2022 at 1:41 PM by Ming Cardenas

## 2022-05-17 ENCOUNTER — OFFICE VISIT (OUTPATIENT)
Dept: FAMILY MEDICINE | Facility: CLINIC | Age: 47
End: 2022-05-17
Payer: MEDICARE

## 2022-05-17 VITALS
TEMPERATURE: 98 F | HEART RATE: 79 BPM | OXYGEN SATURATION: 98 % | BODY MASS INDEX: 32.96 KG/M2 | RESPIRATION RATE: 16 BRPM | WEIGHT: 204.2 LBS | SYSTOLIC BLOOD PRESSURE: 115 MMHG | DIASTOLIC BLOOD PRESSURE: 74 MMHG

## 2022-05-17 DIAGNOSIS — N93.9 VAGINAL BLEEDING: ICD-10-CM

## 2022-05-17 DIAGNOSIS — N93.9 ABNORMAL UTERINE AND VAGINAL BLEEDING, UNSPECIFIED: Primary | ICD-10-CM

## 2022-05-17 LAB
ERYTHROCYTE [DISTWIDTH] IN BLOOD BY AUTOMATED COUNT: 13 % (ref 10–15)
HCG UR QL: NEGATIVE
HCT VFR BLD AUTO: 41.8 % (ref 35–47)
HGB BLD-MCNC: 14.1 G/DL (ref 11.7–15.7)
MCH RBC QN AUTO: 28.8 PG (ref 26.5–33)
MCHC RBC AUTO-ENTMCNC: 33.7 G/DL (ref 31.5–36.5)
MCV RBC AUTO: 85 FL (ref 78–100)
PLATELET # BLD AUTO: 258 10E3/UL (ref 150–450)
RBC # BLD AUTO: 4.9 10E6/UL (ref 3.8–5.2)
TSH SERPL DL<=0.005 MIU/L-ACNC: 1.53 UIU/ML (ref 0.3–5)
WBC # BLD AUTO: 10.1 10E3/UL (ref 4–11)

## 2022-05-17 PROCEDURE — 85027 COMPLETE CBC AUTOMATED: CPT

## 2022-05-17 PROCEDURE — 36415 COLL VENOUS BLD VENIPUNCTURE: CPT

## 2022-05-17 PROCEDURE — 81025 URINE PREGNANCY TEST: CPT

## 2022-05-17 PROCEDURE — 84443 ASSAY THYROID STIM HORMONE: CPT

## 2022-05-17 PROCEDURE — 99213 OFFICE O/P EST LOW 20 MIN: CPT | Mod: GC

## 2022-05-17 NOTE — PROGRESS NOTES
Preceptor Attestation:    I discussed the patient with the resident and evaluated the patient in person. I have verified the content of the note, which accurately reflects my assessment of the patient and the plan of care.   Supervising Physician:  Marquis Coleman MD.

## 2022-05-17 NOTE — PROGRESS NOTES
Assessment & Plan     Abnormal uterine and vaginal bleeding, unspecified  Patient has been experiencing bleeding since  (4 weeks). Restarted Depo Provera 22. LMP 3/25. Previously had no breakthrough bleeding with Depo use in 30's. 1/2 ppd, in process of quitting. Likely due to restarting Depo Provera, but due to enlarged uterus on exam, want to rule out other possible causes of bleeding.   -take naproxen to help slow down bleeding  -TSH  - HCG qualitative urine  - CBC with platelets  - US TRANSVAGINAL; Future  -if continued irregular spotting with Depo Provera, can consider Mirena IUD      Ordering of each unique test  60 minutes spent on the date of the encounter doing chart review, history and exam, documentation and further activities per the note       Tobacco Cessation:   reports that she has been smoking cigarettes and other. She has never used smokeless tobacco.  Tobacco Cessation Action Plan: Pharmacotherapies : Nicotine patch  Patient in action phase of change, used patches, working well, decreasing from 2 ppd, goal to be off by     MEDICATIONS:        - Resume Naproxen 500mg 2 times daily to help relieve bleeding        - Continue other medications without change  FUTURE APPOINTMENTS:       - Make appointment with ultrasound for vaginal ultrasound    Return in about 2 weeks (around 2022), or for vaginal ultrasound.    Marisa Morris MD  [facility]    Ulises Carrillo is a 47 year old who presents for the following health issues  accompanied by her daughter.    ÁLVARO Carrillo is a 48 yo  who presents today for prolonged vaginal bleeding.     Her LMP was 3/25/22.  She recently restarted Depo Provera for birth control 22. Since , she has been bleeding continuously. She reports bleeding is slightly lighter than previous period. She will soak about 1/2 of a tampon 3 times daily (switches every 4-6 hours). She reports no clots passing. No associated  cramping or mood changes, but does report breast tenderness. Patient has been refraining from intercourse while bleeding (which her partner takes umbrage with).     Normal menstrual bleeding last for 3-4 days, involves use of soaking 3 super tampons each day, and associated with abdominal cramping and breast tenderness.  Patient previously used Depo Provera in her 30's for contraception and had no bleeding during that time.     Was curious if additional shot of Depo Provera would stop bleeding.     Current 1/2 PPD smoker, working on quitting with nicotine patches. Very motivated to live as long as possible for her daughter.     No personal or family history of fibroids, uterine cancer, ovarian cancer, or early menopause.     Review of Systems   Positive for breast tenderness, irregular vaginal bleeding.     Negative for fever, chills, fatigue, headache, chest pain, shortness of breath, nausea, diarrhea, constipation, extremity swelling, or balance concerns.       Objective    /74 (BP Location: Left arm, Patient Position: Sitting, Cuff Size: Adult Large)   Pulse 79   Temp 98  F (36.7  C) (Oral)   Resp 16   Wt 92.6 kg (204 lb 3.2 oz)   LMP 2022 (Within Days)   SpO2 98%   Breastfeeding No   BMI 32.96 kg/m    Body mass index is 32.96 kg/m .  Physical Exam   GENERAL: healthy, alert and no distress  NECK: mildly enlarged tonsilar lymph nodes, no asymmetry, masses, or scars and thyroid normal to palpation  RESP: lungs clear to auscultation - no rales, rhonchi or wheezes  BREAST:mildly tender to palpation bilaterally  CV: regular rate and rhythm, normal S1 S2, no S3 or S4, no murmur, click or rub, no peripheral edema and peripheral pulses strong  ABDOMEN: soft, nontender, no hepatosplenomegaly, no masses and bowel sounds normal, uterus palpable 4cm below umbilicus, nontender  MS: no gross musculoskeletal defects noted, no edema  SKIN: incision from low transverse   PSYCH: mentation appears  normal, affect normal/bright    Results for orders placed or performed in visit on 05/17/22 (from the past 24 hour(s))   HCG qualitative urine   Result Value Ref Range    hCG Urine Qualitative Negative Negative   CBC with platelets   Result Value Ref Range    WBC Count 10.1 4.0 - 11.0 10e3/uL    RBC Count 4.90 3.80 - 5.20 10e6/uL    Hemoglobin 14.1 11.7 - 15.7 g/dL    Hematocrit 41.8 35.0 - 47.0 %    MCV 85 78 - 100 fL    MCH 28.8 26.5 - 33.0 pg    MCHC 33.7 31.5 - 36.5 g/dL    RDW 13.0 10.0 - 15.0 %    Platelet Count 258 150 - 450 10e3/uL       ----- Service Performed and Documented by Resident or Fellow ------

## 2022-06-28 ENCOUNTER — ALLIED HEALTH/NURSE VISIT (OUTPATIENT)
Dept: FAMILY MEDICINE | Facility: CLINIC | Age: 47
End: 2022-06-28
Payer: MEDICARE

## 2022-06-28 VITALS — OXYGEN SATURATION: 100 % | HEART RATE: 72 BPM | TEMPERATURE: 98.1 F

## 2022-06-28 DIAGNOSIS — Z30.42 ENCOUNTER FOR SURVEILLANCE OF INJECTABLE CONTRACEPTIVE: Primary | ICD-10-CM

## 2022-06-28 PROCEDURE — 99207 PR NO BILLABLE SERVICE THIS VISIT: CPT

## 2022-06-28 PROCEDURE — 96372 THER/PROPH/DIAG INJ SC/IM: CPT | Performed by: FAMILY MEDICINE

## 2022-06-28 RX ADMIN — MEDROXYPROGESTERONE ACETATE 150 MG: 150 INJECTION, SUSPENSION INTRAMUSCULAR at 13:52

## 2022-06-28 NOTE — NURSING NOTE
Clinic Administered Medication Documentation    Administrations This Visit     medroxyPROGESTERone (DEPO-PROVERA) injection 150 mg     Admin Date  06/28/2022 Action  Given Dose  150 mg Route  Intramuscular Site  Left Ventrogluteal Administered By  Jacki Mendenhall CMA    Ordering Provider: Lloyd Marshall MD    NDC: 8105-0531-64    Lot#: [id number]    : AMPHASTAR-IMS    Patient Supplied?: No                  Depo Provera Documentation    URINE HCG: not indicated    Depo-Provera Standing Order inclusion/exclusion criteria reviewed.   Patient meets: inclusion criteria     BP: Data Unavailable  LAST PAP/EXAM: No results found for: PAP    Prior to injection, verified patient identity using patient's name and date of birth. Medication was administered. Please see MAR and medication order for additional information.     Was entire vial of medication used? Yes  Vial/Syringe: Single dose vial  Expiration Date:  12-    Patient instructed to remain in clinic for 15 minutes.  NEXT INJECTION DUE: 9/14/22 - 9/28/22      Name of provider who requested the medication administration: Dr. Rg  Name of provider on site (faculty or community preceptor) at the time of performing the medication administration: Dr. Samule    Date of next administration:  9/14/22 - 9/28/22  Date of next office visit with provider to renew medication plan (must be seen annually): 04/08/2023

## 2022-09-26 ENCOUNTER — ALLIED HEALTH/NURSE VISIT (OUTPATIENT)
Dept: FAMILY MEDICINE | Facility: CLINIC | Age: 47
End: 2022-09-26
Payer: MEDICARE

## 2022-09-26 VITALS
OXYGEN SATURATION: 96 % | TEMPERATURE: 98.1 F | HEART RATE: 75 BPM | RESPIRATION RATE: 20 BRPM | SYSTOLIC BLOOD PRESSURE: 118 MMHG | DIASTOLIC BLOOD PRESSURE: 86 MMHG

## 2022-09-26 DIAGNOSIS — Z30.9 ENCOUNTER FOR CONTRACEPTIVE MANAGEMENT, UNSPECIFIED TYPE: Primary | ICD-10-CM

## 2022-09-26 PROCEDURE — 96372 THER/PROPH/DIAG INJ SC/IM: CPT | Performed by: FAMILY MEDICINE

## 2022-09-26 RX ADMIN — MEDROXYPROGESTERONE ACETATE 150 MG: 150 INJECTION, SUSPENSION INTRAMUSCULAR at 11:15

## 2022-09-26 NOTE — NURSING NOTE
Clinic Administered Medication Documentation    Administrations This Visit     medroxyPROGESTERone (DEPO-PROVERA) injection 150 mg     Admin Date  09/26/2022 Action  Given Dose  150 mg Route  Intramuscular Site  Right Gluteus Erik Administered By  Amelia Su CMA    Ordering Provider: Lloyd Marshall MD    Patient Supplied?: No    Comments: Manufacture: [employer]                  Depo Provera Documentation    URINE HCG: not indicated    Depo-Provera Standing Order inclusion/exclusion criteria reviewed.   Patient meets: inclusion criteria     BP: 118/86  LAST PAP/EXAM: No results found for: PAP    Prior to injection, verified patient identity using patient's name and date of birth. Medication was administered. Please see MAR and medication order for additional information.     Was entire vial of medication used? Yes  Vial/Syringe: Single dose vial  Expiration Date:  01/31/2024    Patient instructed to remain in clinic for 15 minutes.  NEXT INJECTION DUE: 12/13/22 - 12/27/22      Name of provider who requested the medication administration: Dr. Rg  Name of provider on site (faculty or community preceptor) at the time of performing the medication administration: Dr. Marshall    Date of next administration: 12/13/22 - 12/27/22    Date of next office visit with provider to renew medication plan (must be seen annually): 04/08/2023

## 2022-12-21 ENCOUNTER — ALLIED HEALTH/NURSE VISIT (OUTPATIENT)
Dept: FAMILY MEDICINE | Facility: CLINIC | Age: 47
End: 2022-12-21
Payer: MEDICARE

## 2022-12-21 VITALS
DIASTOLIC BLOOD PRESSURE: 84 MMHG | SYSTOLIC BLOOD PRESSURE: 127 MMHG | OXYGEN SATURATION: 97 % | HEART RATE: 75 BPM | RESPIRATION RATE: 18 BRPM | TEMPERATURE: 98.1 F

## 2022-12-21 DIAGNOSIS — Z30.42 ENCOUNTER FOR SURVEILLANCE OF INJECTABLE CONTRACEPTIVE: Primary | ICD-10-CM

## 2022-12-21 PROCEDURE — 99207 PR NO BILLABLE SERVICE THIS VISIT: CPT

## 2022-12-21 PROCEDURE — 96372 THER/PROPH/DIAG INJ SC/IM: CPT | Performed by: FAMILY MEDICINE

## 2022-12-21 RX ADMIN — MEDROXYPROGESTERONE ACETATE 150 MG: 150 INJECTION, SUSPENSION INTRAMUSCULAR at 02:54

## 2022-12-21 NOTE — NURSING NOTE
Clinic Administered Medication Documentation    Administrations This Visit     medroxyPROGESTERone (DEPO-PROVERA) injection 150 mg     Admin Date  12/21/2022 Action  Given Dose  150 mg Route  Intramuscular Site  Left Ventrogluteal Administered By  Gwendolyn Wells CMA    Ordering Provider: Lucio Strong MD    Patient Supplied?: No                  Depo Provera Documentation    URINE HCG: not indicated    Depo-Provera Standing Order inclusion/exclusion criteria reviewed.   Patient meets: inclusion criteria     BP: 127/84  LAST PAP/EXAM: No results found for: PAP    Prior to injection, verified patient identity using patient's name and date of birth. Medication was administered. Please see MAR and medication order for additional information.     Was entire vial of medication used? Yes  Vial/Syringe: Single dose vial  Expiration Date:  04/30/2024    Patient instructed to remain in clinic for 15 minutes and report any adverse reaction to staff immediately .  NEXT INJECTION DUE: 3/9/23 - 3/23/23      Name of provider who requested the medication administration: Dr. Rg  Name of provider on site (faculty or community preceptor) at the time of performing the medication administration: Cherise Stephens    Date of next administration: 03/09/2023 - 03/23/2023  Date of next office visit with provider to renew medication plan (must be seen annually): 04/08/2023    Administered by FREDERICK Chiang

## 2023-01-07 ENCOUNTER — HEALTH MAINTENANCE LETTER (OUTPATIENT)
Age: 48
End: 2023-01-07

## 2023-01-19 ENCOUNTER — TELEPHONE (OUTPATIENT)
Dept: FAMILY MEDICINE | Facility: CLINIC | Age: 48
End: 2023-01-19
Payer: MEDICARE

## 2023-01-19 NOTE — TELEPHONE ENCOUNTER
[facility] phone call message- general phone call:    Reason for call: She needs a call back re some chest pain I have    Action desired: call back.    Return call needed: Yes    OK to leave a message on voice mail? Yes    Advised patient to response may take up to 2 business days: Yes    Primary language: English      needed? No    Call taken on January 19, 2023 at 11:23 AM by Calos Toribio

## 2023-01-19 NOTE — TELEPHONE ENCOUNTER
"Patient reporting pain under rt breast/rib cage area. Pain started last week and happens when pt is sitting. Pain is sharp does not last long and is relieved with repositioning. Pt states her breathing does change when she has the pain\"labored\".Pt states she has no cardiac hx or recent injury to that area. D/t transportation and financial issues patient unable to come into the clinic until Feb. Patient agreeable to speaking with social work to see if assistance with transportation is available. If ride can be set up patient will make a sooner appt to be seen. If pain increases in severity or duration patient will call clinic or present to an ER.     Note routed to [place] Pritchard    /KUSUM        "

## 2023-01-19 NOTE — TELEPHONE ENCOUNTER
2023: Care Coordination    CC: arranged transportation for an upcoming appointment on: 2023 @ 10:00 am here in clinic. [employer] will pick patient up between: 9:00 am - 9:30 am. When ready to return home, she will need to activate the will call service by [phone].  Patient has been reminded of the appointment and notified of the transportation details.         Severo Grimes Sr.  Social Work  Care Coordination  [address] [address]  [email or web address]   Office: [phone]  Direct: [phone]  [facility]

## 2023-01-19 NOTE — LETTER
January 19, 2023      Lorena Brooks  [address] [place] [address]        Dear Lorena,  Thank you for taking time out of your day to allow me to schedule transportation for an upcoming in person appointment here in the clinic with Dr. Sally Rg on: February 7 2023 at 10:00 am. [employer] will pick you up between: 9:00 am - 9:30 am. When you are ready to return home, you will need to activate the will call service by calling [employer] at:[phone]      Sincerely,          Severo Grimes Sr.  Social Work  Care Coordination  [address] [address]  [email or web address]   Office: [phone]  Direct: [phone]  [facility]

## 2023-02-07 ENCOUNTER — OFFICE VISIT (OUTPATIENT)
Dept: FAMILY MEDICINE | Facility: CLINIC | Age: 48
End: 2023-02-07
Payer: COMMERCIAL

## 2023-02-07 VITALS
RESPIRATION RATE: 16 BRPM | DIASTOLIC BLOOD PRESSURE: 84 MMHG | HEART RATE: 79 BPM | WEIGHT: 236.6 LBS | OXYGEN SATURATION: 98 % | TEMPERATURE: 97.9 F | SYSTOLIC BLOOD PRESSURE: 127 MMHG | BODY MASS INDEX: 38.19 KG/M2

## 2023-02-07 DIAGNOSIS — Z12.11 SCREENING FOR COLON CANCER: ICD-10-CM

## 2023-02-07 DIAGNOSIS — Z12.11 SCREEN FOR COLON CANCER: ICD-10-CM

## 2023-02-07 DIAGNOSIS — R07.9 CHEST PAIN, UNSPECIFIED TYPE: Primary | ICD-10-CM

## 2023-02-07 PROCEDURE — 90471 IMMUNIZATION ADMIN: CPT | Performed by: STUDENT IN AN ORGANIZED HEALTH CARE EDUCATION/TRAINING PROGRAM

## 2023-02-07 PROCEDURE — 99214 OFFICE O/P EST MOD 30 MIN: CPT | Mod: 25 | Performed by: STUDENT IN AN ORGANIZED HEALTH CARE EDUCATION/TRAINING PROGRAM

## 2023-02-07 PROCEDURE — 90715 TDAP VACCINE 7 YRS/> IM: CPT | Performed by: STUDENT IN AN ORGANIZED HEALTH CARE EDUCATION/TRAINING PROGRAM

## 2023-02-07 PROCEDURE — 93005 ELECTROCARDIOGRAM TRACING: CPT | Mod: GC | Performed by: STUDENT IN AN ORGANIZED HEALTH CARE EDUCATION/TRAINING PROGRAM

## 2023-02-07 ASSESSMENT — PATIENT HEALTH QUESTIONNAIRE - PHQ9: SUM OF ALL RESPONSES TO PHQ QUESTIONS 1-9: 7

## 2023-02-07 NOTE — PATIENT INSTRUCTIONS
Your lab work and blood pressure are reassuring    Please get the coronary angiogram     Make a return visit to talk about preventive care, weight loss, stress

## 2023-02-07 NOTE — PROGRESS NOTES
Preceptor Attestation:    I discussed the patient with the resident and evaluated the patient in person. I personally viewed the EKG and agree with the interpretation documented by the resident. I have verified the content of the note, which accurately reflects my assessment of the patient and the plan of care.   Supervising Physician:  Molina Samuel MD.
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
## 2023-03-07 ENCOUNTER — TELEPHONE (OUTPATIENT)
Dept: FAMILY MEDICINE | Facility: CLINIC | Age: 48
End: 2023-03-07
Payer: COMMERCIAL

## 2023-03-07 NOTE — TELEPHONE ENCOUNTER
Medical Transportation Coordination    Appt Date: 3/23/23  Arrival Time: 1025am  Appt Location & Address: [address] [address]    Patient's Phone Number: [phone]  Patient's Pick-up Address: [address] [place] [address]. [address] [place] [address]    Tranportation Name & Phone Number:  [employer], [phone]  Estimated Pick-up Time: 930am-942am    Roundtrip?  Yes, will call for return ride  Patient Notified?  Yes      Ema Rosales

## 2023-03-23 ENCOUNTER — ANCILLARY ORDERS (OUTPATIENT)
Dept: FAMILY MEDICINE | Facility: CLINIC | Age: 48
End: 2023-03-23

## 2023-03-23 ENCOUNTER — ALLIED HEALTH/NURSE VISIT (OUTPATIENT)
Dept: FAMILY MEDICINE | Facility: CLINIC | Age: 48
End: 2023-03-23
Payer: COMMERCIAL

## 2023-03-23 VITALS
OXYGEN SATURATION: 97 % | DIASTOLIC BLOOD PRESSURE: 82 MMHG | RESPIRATION RATE: 20 BRPM | SYSTOLIC BLOOD PRESSURE: 116 MMHG | HEART RATE: 73 BPM | TEMPERATURE: 98 F

## 2023-03-23 DIAGNOSIS — Z30.9 ENCOUNTER FOR CONTRACEPTIVE MANAGEMENT, UNSPECIFIED TYPE: Primary | ICD-10-CM

## 2023-03-23 DIAGNOSIS — R07.9 CHEST PAIN, UNSPECIFIED TYPE: ICD-10-CM

## 2023-03-23 PROCEDURE — 96372 THER/PROPH/DIAG INJ SC/IM: CPT | Performed by: STUDENT IN AN ORGANIZED HEALTH CARE EDUCATION/TRAINING PROGRAM

## 2023-03-23 PROCEDURE — 99207 PR NO BILLABLE SERVICE THIS VISIT: CPT

## 2023-03-23 RX ADMIN — MEDROXYPROGESTERONE ACETATE 150 MG: 150 INJECTION, SUSPENSION INTRAMUSCULAR at 14:15

## 2023-03-23 NOTE — NURSING NOTE
Clinic Administered Medication Documentation        Patient was given Depo. Prior to medication administration, verified patient's identity using patient s name and date of birth. Please see MAR and medication order for additional information. Patient instructed to remain in clinic for 15 minutes and report any adverse reaction to staff immediately.    Vial/Syringe: Single dose vial. Was entire vial of medication used? Yes    Name of provider who requested the medication administration: Dr. Rg  Name of provider on site (faculty or community preceptor) at the time of performing the medication administration: Dr. Peña    Date of next administration: 6/9/2023 to 6/23/2023  Date of next office visit with provider to renew medication plan (must be seen annually): 4/8/2023     Also told patient to see a doctor for next depo to renew the depo medication.    LOT #: [id number]  EXP: 5/31/2024  NDC: 62146-383-67  SITE: FREDERICK Holguin Mai

## 2023-06-02 ENCOUNTER — HEALTH MAINTENANCE LETTER (OUTPATIENT)
Age: 48
End: 2023-06-02

## 2023-06-08 ENCOUNTER — ANCILLARY ORDERS (OUTPATIENT)
Dept: FAMILY MEDICINE | Facility: CLINIC | Age: 48
End: 2023-06-08

## 2023-06-08 DIAGNOSIS — R07.9 CHEST PAIN, UNSPECIFIED TYPE: ICD-10-CM

## 2023-06-12 ENCOUNTER — ALLIED HEALTH/NURSE VISIT (OUTPATIENT)
Dept: FAMILY MEDICINE | Facility: CLINIC | Age: 48
End: 2023-06-12
Payer: COMMERCIAL

## 2023-06-12 VITALS
TEMPERATURE: 97.9 F | RESPIRATION RATE: 16 BRPM | HEART RATE: 83 BPM | OXYGEN SATURATION: 97 % | DIASTOLIC BLOOD PRESSURE: 91 MMHG | SYSTOLIC BLOOD PRESSURE: 138 MMHG

## 2023-06-12 DIAGNOSIS — Z30.013 ENCOUNTER FOR PRESCRIPTION FOR DEPO-PROVERA: Primary | ICD-10-CM

## 2023-06-12 PROCEDURE — 96372 THER/PROPH/DIAG INJ SC/IM: CPT | Performed by: STUDENT IN AN ORGANIZED HEALTH CARE EDUCATION/TRAINING PROGRAM

## 2023-06-12 PROCEDURE — 99207 PR NO BILLABLE SERVICE THIS VISIT: CPT | Mod: GC | Performed by: STUDENT IN AN ORGANIZED HEALTH CARE EDUCATION/TRAINING PROGRAM

## 2023-06-12 RX ORDER — MEDROXYPROGESTERONE ACETATE 150 MG/ML
150 INJECTION, SUSPENSION INTRAMUSCULAR
Status: COMPLETED | OUTPATIENT
Start: 2023-06-12 | End: 2024-03-05

## 2023-06-12 RX ADMIN — MEDROXYPROGESTERONE ACETATE 150 MG: 150 INJECTION, SUSPENSION INTRAMUSCULAR at 15:03

## 2023-06-12 NOTE — PROGRESS NOTES
Assessment & Plan     Encounter for prescription for depo-Provera  Does not appear that she is having worrisome side effects, recommended renewing injection. Will be due in 3 months  - medroxyPROGESTERone (DEPO-PROVERA) injection 150 mg    20 minutes spent by me on the date of the encounter doing chart review, history and exam, documentation and further activities per the note       Nicotine/Tobacco Cessation:  She reports that she has been smoking cigarettes and other. She has never used smokeless tobacco.  Nicotine/Tobacco Cessation Plan:   Patient to follow up with PCP    Gabrielle Farr MD PGY3  [facility]  Precepted with Dr. Burton   Lorena Brooks is a 48 year old who presents for the following health issues     HPI     Presents for depo follow up. She has no concerns with this medication, has been on it for sometime. She is spotting presently, though notes that she usually does when she is due for another injection.     Review of Systems   Complete ROS normal aside noted in HPI      Objective    BP (!) 138/91 (BP Location: Left arm, Patient Position: Sitting, Cuff Size: Adult Regular)   Pulse 83   Temp 97.9  F (36.6  C) (Oral)   Resp 16   SpO2 97%   There is no height or weight on file to calculate BMI.    Physical Exam   GENERAL: healthy, alert and no distress  RESP: lungs clear to auscultation - no rales, rhonchi or wheezes  CV: regular rate and rhythm, normal S1 S2, no S3 or S4, no murmur, click or rub, no peripheral edema and peripheral pulses strong  MS: no gross musculoskeletal defects noted, no edema      ----- Service Performed and Documented by Resident or Fellow ------

## 2023-06-12 NOTE — PROGRESS NOTES
Preceptor Attestation:    I discussed the patient with the resident and evaluated the patient in person. I have verified the content of the note, which accurately reflects my assessment of the patient and the plan of care.   Supervising Physician:  Anderson Mccracken MD.

## 2023-06-12 NOTE — NURSING NOTE
Clinic Administered Medication Documentation        Patient was given Depo Provera. Prior to medication administration, verified patient's identity using patient s name and date of birth. Please see MAR and medication order for additional information. Patient instructed to remain in clinic for 15 minutes and report any adverse reaction to staff immediately.    Vial/Syringe: Single dose vial. Was entire vial of medication used? Yes    NEXT INJECTION DUE: 8/29/23 - 9/12/23    Name of provider who requested the medication administration: Dr. Farr  Name of provider on site (faculty or community preceptor) at the time of performing the medication administration: Dr. Mccracken    Date of next administration: 8/29/23 - 9/12/23  Date of next office visit with provider to renew medication plan (must be seen annually): 06/12/2024

## 2023-09-05 ENCOUNTER — ALLIED HEALTH/NURSE VISIT (OUTPATIENT)
Dept: FAMILY MEDICINE | Facility: CLINIC | Age: 48
End: 2023-09-05
Payer: COMMERCIAL

## 2023-09-05 VITALS
BODY MASS INDEX: 37.12 KG/M2 | HEART RATE: 73 BPM | OXYGEN SATURATION: 97 % | WEIGHT: 230 LBS | SYSTOLIC BLOOD PRESSURE: 134 MMHG | DIASTOLIC BLOOD PRESSURE: 89 MMHG

## 2023-09-05 DIAGNOSIS — Z30.42 DEPO-PROVERA CONTRACEPTIVE STATUS: Primary | ICD-10-CM

## 2023-09-05 PROCEDURE — 96372 THER/PROPH/DIAG INJ SC/IM: CPT | Performed by: STUDENT IN AN ORGANIZED HEALTH CARE EDUCATION/TRAINING PROGRAM

## 2023-09-05 PROCEDURE — 99207 PR NO CHARGE NURSE ONLY: CPT

## 2023-09-05 RX ADMIN — MEDROXYPROGESTERONE ACETATE 150 MG: 150 INJECTION, SUSPENSION INTRAMUSCULAR at 10:39

## 2023-09-05 NOTE — PROGRESS NOTES
Clinic Administered Medication Documentation      Depo Provera Documentation    Depo-Provera Standing Order inclusion/exclusion criteria reviewed.     Is this the initial or subsequent dose of Depo Provera? Subsequent dose - patient is within the acceptable window of time (11-15 weeks) for subsequent injection. Pregnancy test not indicated.    Patient meets: inclusion criteria     Is there an active order (written within the past 365 days, with administrations remaining, not ) in the chart? Yes.     Prior to injection, verified patient identity using patient's name and date of birth. Medication was administered. Please see MAR and medication order for additional information.     Vial/Syringe: Single dose vial. Was entire vial of medication used? Yes    Patient instructed to remain in clinic for 15 minutes, report any adverse reaction to staff immediately, and remain in clinic for 15 minutes and report any adverse reaction to staff immediately but patient declined.  NEXT INJECTION DUE: 23 - 23    Verified that the patient has refills remaining in their prescription.    Name of provider who requested the medication administration: Dr. Farr  Name of provider on site (faculty or community preceptor) at the time of performing the medication administration:     Date of next administration: 2023 -- 2023  Date of next office visit with provider to renew medication plan (must be seen annually): 2024    Мария PEDERSEN

## 2023-12-18 ENCOUNTER — ALLIED HEALTH/NURSE VISIT (OUTPATIENT)
Dept: FAMILY MEDICINE | Facility: CLINIC | Age: 48
End: 2023-12-18
Payer: COMMERCIAL

## 2023-12-18 VITALS
TEMPERATURE: 98 F | HEART RATE: 75 BPM | SYSTOLIC BLOOD PRESSURE: 127 MMHG | DIASTOLIC BLOOD PRESSURE: 87 MMHG | OXYGEN SATURATION: 97 % | WEIGHT: 227 LBS | BODY MASS INDEX: 36.48 KG/M2 | RESPIRATION RATE: 18 BRPM | HEIGHT: 66 IN

## 2023-12-18 DIAGNOSIS — Z30.42 ENCOUNTER FOR SURVEILLANCE OF INJECTABLE CONTRACEPTIVE: Primary | ICD-10-CM

## 2023-12-18 PROCEDURE — 96372 THER/PROPH/DIAG INJ SC/IM: CPT | Performed by: FAMILY MEDICINE

## 2023-12-18 RX ADMIN — MEDROXYPROGESTERONE ACETATE 150 MG: 150 INJECTION, SUSPENSION INTRAMUSCULAR at 09:36

## 2023-12-18 NOTE — NURSING NOTE
Clinic Administered Medication Documentation      Depo Provera Documentation    Depo-Provera Standing Order inclusion/exclusion criteria reviewed.     Is this the initial or subsequent dose of Depo Provera? Subsequent dose - patient is within the acceptable window of time (11-15 weeks) for subsequent injection. Pregnancy test not indicated.    Patient meets: inclusion criteria     Is there an active order (written within the past 365 days, with administrations remaining, not ) in the chart? Yes.     Prior to injection, verified patient identity using patient's name and date of birth. Medication was administered. Please see MAR and medication order for additional information.     Vial/Syringe: Single dose vial. Was entire vial of medication used? Yes    Patient instructed to remain in clinic for 15 minutes and report any adverse reaction to staff immediately but patient declined.  NEXT INJECTION DUE: 3/4/24 - 24    Verified that the patient has refills remaining in their prescription.    Name of provider who requested the medication administration: Dr. Farr   Name of provider on site (faculty or community preceptor) at the time of performing the medication administration: Dr. Stephens    Date of next administration: 2024-2024  Date of next office visit with provider to renew medication plan (must be seen annually): 2024    FREDERICK Olguin

## 2024-03-05 ENCOUNTER — ALLIED HEALTH/NURSE VISIT (OUTPATIENT)
Dept: FAMILY MEDICINE | Facility: CLINIC | Age: 49
End: 2024-03-05
Payer: COMMERCIAL

## 2024-03-05 VITALS
DIASTOLIC BLOOD PRESSURE: 92 MMHG | BODY MASS INDEX: 38.25 KG/M2 | WEIGHT: 237 LBS | RESPIRATION RATE: 16 BRPM | HEART RATE: 97 BPM | SYSTOLIC BLOOD PRESSURE: 138 MMHG

## 2024-03-05 DIAGNOSIS — Z30.42 ENCOUNTER FOR SURVEILLANCE OF INJECTABLE CONTRACEPTIVE: Primary | ICD-10-CM

## 2024-03-05 PROCEDURE — 96372 THER/PROPH/DIAG INJ SC/IM: CPT | Performed by: FAMILY MEDICINE

## 2024-03-05 RX ADMIN — MEDROXYPROGESTERONE ACETATE 150 MG: 150 INJECTION, SUSPENSION INTRAMUSCULAR at 10:06

## 2024-03-05 NOTE — PROGRESS NOTES
Clinic Administered Medication Documentation      Depo Provera Documentation    Depo-Provera Standing Order inclusion/exclusion criteria reviewed.     Is this the initial or subsequent dose of Depo Provera? Subsequent dose - patient is within the acceptable window of time (11-15 weeks) for subsequent injection. Pregnancy test not indicated.    Patient meets: inclusion criteria     Is there an active order (written within the past 365 days, with administrations remaining, not ) in the chart? Yes.     Prior to injection, verified patient identity using patient's name and date of birth. Medication was administered. Please see MAR and medication order for additional information.     Vial/Syringe: Single dose vial. Was entire vial of medication used? Yes    Patient instructed to remain in clinic for 15 minutes and report any adverse reaction to staff immediately.  NEXT INJECTION DUE: 24 - 24    Patient has no refills remaining. Patient instructed to make a appointment with Provider for next visit.    Name of provider who requested the medication administration: Dr. Rama Mccracken  Name of provider on site (faculty or community preceptor) at the time of performing the medication administration:Dr. Lucio Storng    Date of next administration: 24 - 24  Date of next office visit with provider to renew medication plan (must be seen annually): By 24

## 2024-05-29 ENCOUNTER — OFFICE VISIT (OUTPATIENT)
Dept: FAMILY MEDICINE | Facility: CLINIC | Age: 49
End: 2024-05-29
Payer: COMMERCIAL

## 2024-05-29 VITALS
BODY MASS INDEX: 40.18 KG/M2 | WEIGHT: 250 LBS | SYSTOLIC BLOOD PRESSURE: 118 MMHG | HEIGHT: 66 IN | TEMPERATURE: 98.6 F | OXYGEN SATURATION: 96 % | HEART RATE: 82 BPM | RESPIRATION RATE: 16 BRPM | DIASTOLIC BLOOD PRESSURE: 85 MMHG

## 2024-05-29 DIAGNOSIS — Z30.013 ENCOUNTER FOR PRESCRIPTION FOR DEPO-PROVERA: Primary | ICD-10-CM

## 2024-05-29 DIAGNOSIS — M25.562 ARTHRALGIA OF BOTH KNEES: ICD-10-CM

## 2024-05-29 DIAGNOSIS — M25.561 ARTHRALGIA OF BOTH KNEES: ICD-10-CM

## 2024-05-29 PROCEDURE — 96372 THER/PROPH/DIAG INJ SC/IM: CPT

## 2024-05-29 PROCEDURE — 99213 OFFICE O/P EST LOW 20 MIN: CPT | Mod: 25

## 2024-05-29 RX ORDER — MEDROXYPROGESTERONE ACETATE 150 MG/ML
150 INJECTION, SUSPENSION INTRAMUSCULAR
Status: ACTIVE | OUTPATIENT
Start: 2024-05-29 | End: 2025-05-24

## 2024-05-29 RX ADMIN — MEDROXYPROGESTERONE ACETATE 150 MG: 150 INJECTION, SUSPENSION INTRAMUSCULAR at 11:08

## 2024-05-29 NOTE — PROGRESS NOTES
Preceptor Attestation:    I discussed the patient with the resident and evaluated the patient in person. I have verified the content of the note, which accurately reflects my assessment of the patient and the plan of care.   Supervising Physician:  Modesto Jacobs DO.

## 2024-05-29 NOTE — PROGRESS NOTES

## 2024-05-29 NOTE — PROGRESS NOTES
"  Assessment & Plan     Arthralgia of both knees  Patient starting to have some knee pain when getting up from sitting. Bilateral, likely osteoarthritis. Will start with conservative management w/ voltaren gel and acetaminophen.   - diclofenac (VOLTAREN) 1 % topical gel  Dispense: 350 g; Refill: 11    Encounter for prescription for depo-Provera  Last injection 3/5/2024, no questions or concerns. Has been working well for her. Will renew the order today.   - medroxyPROGESTERone (DEPO-PROVERA) injection 150 mg    Nicotine/Tobacco Cessation  She reports that she has been smoking cigarettes and other. She has never used smokeless tobacco.    BMI  Estimated body mass index is 40.35 kg/m  as calculated from the following:    Height as of this encounter: 1.676 m (5' 6\").    Weight as of this encounter: 113.4 kg (250 lb).       No follow-ups on file.    Subjective   Lorena is a 49 year old, presenting for the following health issues:  Contraception (Renew depo)        5/29/2024    10:46 AM   Additional Questions   Roomed by Ml   Accompanied by self         5/29/2024    Information    services provided? No     HPI   Here for renewal of depo shot. Last injection was 3/5/2024 and she is still in the window of coverage. States that she has noticed a little bit of knee pain when she gets up from seated position, less so when she is walking about. No additional questions or concerns today.         Objective    /85   Pulse 82   Temp 98.6  F (37  C) (Oral)   Resp 16   Ht 1.676 m (5' 6\")   Wt 113.4 kg (250 lb)   SpO2 96%   BMI 40.35 kg/m    Body mass index is 40.35 kg/m .  Physical Exam   GENERAL: Awake, alert, No acute distress.   HEENT: No scleral icterus or conjunctival injection.   SKIN: Warm and dry. No bruises, rashes, or skin lesions.  LUNGS: Normal work of breathing with no use of accessory muscles. Clear breath sounds in all lung fields bilaterally with no wheezes or crackles " appreciated.  CARDIAC: RRR. Normal S1 and S2. No murmurs, clicks, or rubs appreciated. No JVD.   ABDOMEN: Non-distended.   EXTREMITIES: No gross deformity. Appear well-perfused.         Signed Electronically by: CASEY RIVERA MD  Staffed with Modesto Jacobs DO

## 2024-06-29 ENCOUNTER — HEALTH MAINTENANCE LETTER (OUTPATIENT)
Age: 49
End: 2024-06-29

## 2024-10-01 ENCOUNTER — TRANSFERRED RECORDS (OUTPATIENT)
Dept: HEALTH INFORMATION MANAGEMENT | Facility: CLINIC | Age: 49
End: 2024-10-01
Payer: COMMERCIAL

## 2024-10-11 ENCOUNTER — OFFICE VISIT (OUTPATIENT)
Dept: FAMILY MEDICINE | Facility: CLINIC | Age: 49
End: 2024-10-11
Payer: COMMERCIAL

## 2024-10-11 VITALS
HEIGHT: 66 IN | SYSTOLIC BLOOD PRESSURE: 120 MMHG | BODY MASS INDEX: 36.96 KG/M2 | DIASTOLIC BLOOD PRESSURE: 80 MMHG | WEIGHT: 230 LBS | TEMPERATURE: 98.8 F | RESPIRATION RATE: 18 BRPM | OXYGEN SATURATION: 95 % | HEART RATE: 83 BPM

## 2024-10-11 DIAGNOSIS — Z30.42 DEPO-PROVERA CONTRACEPTIVE STATUS: Primary | ICD-10-CM

## 2024-10-11 LAB — HCG UR QL: NEGATIVE

## 2024-10-11 PROCEDURE — 99213 OFFICE O/P EST LOW 20 MIN: CPT | Mod: 25

## 2024-10-11 PROCEDURE — 96372 THER/PROPH/DIAG INJ SC/IM: CPT | Performed by: STUDENT IN AN ORGANIZED HEALTH CARE EDUCATION/TRAINING PROGRAM

## 2024-10-11 PROCEDURE — 81025 URINE PREGNANCY TEST: CPT

## 2024-10-11 RX ORDER — CHOLECALCIFEROL (VITAMIN D3) 50 MCG
1 TABLET ORAL DAILY
Qty: 90 TABLET | Refills: 3 | Status: SHIPPED | OUTPATIENT
Start: 2024-10-11 | End: 2025-10-06

## 2024-10-11 RX ORDER — MEDROXYPROGESTERONE ACETATE 150 MG/ML
150 INJECTION, SUSPENSION INTRAMUSCULAR
Qty: 0.9 ML | Refills: 0 | Status: SHIPPED | OUTPATIENT
Start: 2024-10-11 | End: 2024-10-11

## 2024-10-11 RX ORDER — MEDROXYPROGESTERONE ACETATE 150 MG/ML
150 INJECTION, SUSPENSION INTRAMUSCULAR
Status: ACTIVE | OUTPATIENT
Start: 2024-10-11 | End: 2025-10-06

## 2024-10-11 RX ADMIN — MEDROXYPROGESTERONE ACETATE 150 MG: 150 INJECTION, SUSPENSION INTRAMUSCULAR at 09:34

## 2024-10-11 NOTE — PATIENT INSTRUCTIONS
We're going to do the depo shot today. Please consider the long term risks involved with depo shots such as your bone health. We're getting to the age where you may not need the depo shot anymore as we're nearing menopause. Let's optimize your bone health by vitamin D supplementation, weight lifting, cutting back on smoking.

## 2024-10-11 NOTE — PROGRESS NOTES
Physician Attestation   I, Manolo Jacobs MD, saw this patient and agree with the findings and plan of care as documented in the note.      Items personally reviewed/procedural attestation: vitals.    Manolo Jacobs MD

## 2024-10-11 NOTE — PROGRESS NOTES
Clinic Administered Medication Documentation        Patient was given Depo Provera. Prior to medication administration, verified patient's identity using patient s name and date of birth. Please see MAR and medication order for additional information. Patient instructed to remain in clinic for 15 minutes and report any adverse reaction to staff immediately.    Vial/Syringe: Single dose vial. Was entire vial of medication used? Yes    NEXT INJECTION DUE: 12/27/24 - 1/24/25    Clinic Administered Medication Documentation        Patient was given Depo Provera. Prior to medication administration, verified patient's identity using patient s name and date of birth. Please see MAR and medication order for additional information. Patient instructed to remain in clinic for 15 minutes, report any adverse reaction to staff immediately, and remain in clinic for 15 minutes and report any adverse reaction to staff immediately but patient declined.    Vial/Syringe: Single dose vial. Was entire vial of medication used? Yes    NEXT INJECTION DUE: 12/27/24 - 1/24/25    Name of provider who requested the medication administration: Dr. Blackwood  Name of provider on site (faculty or community preceptor) at the time of performing the medication administration: Dr. Reynaldo French    Date of next administration: 12/27/2024 to 1/24/2025  Date of next office visit with provider to renew medication plan (must be seen annually): 10/11/2025

## 2024-10-11 NOTE — PROGRESS NOTES
Assessment & Plan     Depo-Provera contraceptive status  49-year-old female here for Depo shot.  Missed previous window for repeat shot and so UPT was obtained today and negative.  She has been on the Depo since 2018, longer than recommended 2 years.  Long discussion was had with patient regarding long-term use of Depo such as higher risk for osteoporosis.  She is also nearing the age of menopause and so prolonged Depo may not be appropriate.  Patient desiring shot today and will consider coming off of it at future visits.  - HCG qualitative urine  - HCG qualitative urine  - vitamin D3 (CHOLECALCIFEROL) 50 mcg (2000 units) tablet  Dispense: 90 tablet; Refill: 3  - medroxyPROGESTERone (DEPO-PROVERA) injection 150 mg    Patient seen and discussed with Dr. Manolo Jacobs     Resident/Fellow Attestation   I, Jeison Blackwood MD, was present with the medical/SON student ALFONSO Regalado, who participated in the service and in the documentation of the note.  I have verified the history and personally performed the physical exam and medical decision making.  I agree with the assessment and plan of care as documented in the note.      Jeison Blackwood MD  PGY2  Date of Service (when I saw the patient): 10/11/24     Jeison Blackwood MD, MPH   PGY-2  [facility]   [address] [place] [address]  [phone]      Subjective   Lorena is a 49 year old, presenting for the following health issues:  Contraception (Late Depo)        10/11/2024     8:51 AM   Additional Questions   Roomed by shoua   Accompanied by alone         10/11/2024    Information    services provided? No      HPI     41-year-old female here for Depo-Provera shot.  Last injection was 5/29/2024.  Patient missed timeframe for repeat injection which was supposed to be from 8/14/2024 to 9/11/2024.  Has been on Depo shots since 2018. She is doing well today. She likes depo cause she doesn't get a period and wants to  "continue. She has a history of heavy menses. Denies hot flashes or vaginal dryness. Unsure when her mother went through menopause,  at an early age. Not currently sexually active. Has not had any bleeding or spotting since depo.           Objective    /80 (BP Location: Left arm, Patient Position: Sitting, Cuff Size: Adult Large)   Pulse 83   Temp 98.8  F (37.1  C) (Oral)   Resp 18   Ht 1.676 m (5' 6\")   Wt 104.3 kg (230 lb)   SpO2 95%   BMI 37.12 kg/m    Body mass index is 37.12 kg/m .  Physical Exam   GENERAL: alert and no distress  MS: no gross musculoskeletal defects noted, no edema      Signed Electronically by: Jeison Blackwood MD    "

## 2025-02-08 ENCOUNTER — HEALTH MAINTENANCE LETTER (OUTPATIENT)
Age: 50
End: 2025-02-08

## 2025-02-13 ENCOUNTER — TELEPHONE (OUTPATIENT)
Dept: FAMILY MEDICINE | Facility: CLINIC | Age: 50
End: 2025-02-13
Payer: COMMERCIAL

## 2025-02-13 NOTE — TELEPHONE ENCOUNTER
Patient Quality Outreach    Patient is due for the following:   Cervical Cancer Screening - PAP Needed    Action(s) Taken:   Schedule a office visit for PAP    Type of outreach:    Phone, spoke to patient/parent.      Questions for provider review:    None     Remy Su, CMA

## 2025-03-03 ENCOUNTER — TRANSFERRED RECORDS (OUTPATIENT)
Dept: HEALTH INFORMATION MANAGEMENT | Facility: CLINIC | Age: 50
End: 2025-03-03
Payer: COMMERCIAL

## 2025-04-08 ENCOUNTER — ALLIED HEALTH/NURSE VISIT (OUTPATIENT)
Dept: FAMILY MEDICINE | Facility: CLINIC | Age: 50
End: 2025-04-08
Payer: COMMERCIAL

## 2025-04-08 VITALS
RESPIRATION RATE: 16 BRPM | SYSTOLIC BLOOD PRESSURE: 126 MMHG | OXYGEN SATURATION: 97 % | DIASTOLIC BLOOD PRESSURE: 86 MMHG | HEART RATE: 83 BPM

## 2025-04-08 DIAGNOSIS — Z30.42 ENCOUNTER FOR SURVEILLANCE OF INJECTABLE CONTRACEPTIVE: Primary | ICD-10-CM

## 2025-04-08 RX ADMIN — MEDROXYPROGESTERONE ACETATE 150 MG: 150 INJECTION, SUSPENSION INTRAMUSCULAR at 13:49

## 2025-04-08 NOTE — PROGRESS NOTES
Clinic Administered Medication Documentation      Depo Provera Documentation    Depo-Provera Standing Order inclusion/exclusion criteria reviewed.     Is this the initial or subsequent dose of Depo Provera? Subsequent dose - patient is within the acceptable window of time (11-15 weeks) for subsequent injection. Pregnancy test not indicated.    Patient meets: inclusion criteria     Is there an active order (written within the past 365 days, with administrations remaining, not ) in the chart? Yes.     Prior to injection, verified patient identity using patient's name and date of birth. Medication was administered. Please see MAR and medication order for additional information.     Vial/Syringe: Single dose vial. Was entire vial of medication used? Yes    Patient instructed to remain in clinic for 15 minutes and report any adverse reaction to staff immediately.  NEXT INJECTION DUE: 25 - 25    Verified that the patient has refills remaining in their prescription.    Name of provider who requested the medication administration: Dr. Nguyen   Name of provider on site (faculty or community preceptor) at the time of performing the medication administration: Dr. Baugh    Date of next administration: 25 - 25  Date of next office visit with provider to renew medication plan (must be seen annually):

## 2025-04-19 ENCOUNTER — HEALTH MAINTENANCE LETTER (OUTPATIENT)
Age: 50
End: 2025-04-19